# Patient Record
Sex: MALE | Race: WHITE | NOT HISPANIC OR LATINO | Employment: OTHER | URBAN - METROPOLITAN AREA
[De-identification: names, ages, dates, MRNs, and addresses within clinical notes are randomized per-mention and may not be internally consistent; named-entity substitution may affect disease eponyms.]

---

## 2019-05-15 ENCOUNTER — HOSPITAL ENCOUNTER (EMERGENCY)
Facility: HOSPITAL | Age: 79
Discharge: HOME | End: 2019-05-15
Attending: EMERGENCY MEDICINE | Admitting: EMERGENCY MEDICINE

## 2019-05-15 ENCOUNTER — APPOINTMENT (OUTPATIENT)
Dept: CT IMAGING | Facility: HOSPITAL | Age: 79
End: 2019-05-15

## 2019-05-15 VITALS
WEIGHT: 180 LBS | DIASTOLIC BLOOD PRESSURE: 79 MMHG | SYSTOLIC BLOOD PRESSURE: 195 MMHG | RESPIRATION RATE: 14 BRPM | HEART RATE: 77 BPM | OXYGEN SATURATION: 95 % | BODY MASS INDEX: 28.93 KG/M2 | HEIGHT: 66 IN | TEMPERATURE: 97.6 F

## 2019-05-15 DIAGNOSIS — C64.1 RENAL CELL CARCINOMA OF RIGHT KIDNEY (HCC): ICD-10-CM

## 2019-05-15 DIAGNOSIS — R31.0 GROSS HEMATURIA: Primary | ICD-10-CM

## 2019-05-15 LAB
ALBUMIN SERPL-MCNC: 4.6 G/DL (ref 3.5–5.2)
ALBUMIN/GLOB SERPL: 1.8 G/DL
ALP SERPL-CCNC: 79 U/L (ref 39–117)
ALT SERPL W P-5'-P-CCNC: 10 U/L (ref 1–41)
ANION GAP SERPL CALCULATED.3IONS-SCNC: 13 MMOL/L
AST SERPL-CCNC: 17 U/L (ref 1–40)
BACTERIA UR QL AUTO: ABNORMAL /HPF
BASOPHILS # BLD AUTO: 0.02 10*3/MM3 (ref 0–0.2)
BASOPHILS NFR BLD AUTO: 0.3 % (ref 0–1.5)
BILIRUB SERPL-MCNC: 1.1 MG/DL (ref 0.2–1.2)
BILIRUB UR QL STRIP: NEGATIVE
BUN BLD-MCNC: 14 MG/DL (ref 8–23)
BUN/CREAT SERPL: 12.3 (ref 7–25)
CALCIUM SPEC-SCNC: 9.7 MG/DL (ref 8.6–10.5)
CHLORIDE SERPL-SCNC: 102 MMOL/L (ref 98–107)
CLARITY UR: ABNORMAL
CO2 SERPL-SCNC: 26 MMOL/L (ref 22–29)
COLOR UR: ABNORMAL
CREAT BLD-MCNC: 1.14 MG/DL (ref 0.76–1.27)
DEPRECATED RDW RBC AUTO: 43.6 FL (ref 37–54)
EOSINOPHIL # BLD AUTO: 0.11 10*3/MM3 (ref 0–0.4)
EOSINOPHIL NFR BLD AUTO: 1.5 % (ref 0.3–6.2)
ERYTHROCYTE [DISTWIDTH] IN BLOOD BY AUTOMATED COUNT: 13.3 % (ref 12.3–15.4)
GFR SERPL CREATININE-BSD FRML MDRD: 62 ML/MIN/1.73
GLOBULIN UR ELPH-MCNC: 2.6 GM/DL
GLUCOSE BLD-MCNC: 98 MG/DL (ref 65–99)
GLUCOSE UR STRIP-MCNC: NEGATIVE MG/DL
HCT VFR BLD AUTO: 47.5 % (ref 37.5–51)
HGB BLD-MCNC: 17.3 G/DL (ref 13–17.7)
HGB UR QL STRIP.AUTO: ABNORMAL
HOLD SPECIMEN: NORMAL
HOLD SPECIMEN: NORMAL
HYALINE CASTS UR QL AUTO: ABNORMAL /LPF
IMM GRANULOCYTES # BLD AUTO: 0.02 10*3/MM3 (ref 0–0.05)
IMM GRANULOCYTES NFR BLD AUTO: 0.3 % (ref 0–0.5)
KETONES UR QL STRIP: ABNORMAL
LEUKOCYTE ESTERASE UR QL STRIP.AUTO: ABNORMAL
LIPASE SERPL-CCNC: 17 U/L (ref 13–60)
LYMPHOCYTES # BLD AUTO: 1.51 10*3/MM3 (ref 0.7–3.1)
LYMPHOCYTES NFR BLD AUTO: 20 % (ref 19.6–45.3)
MCH RBC QN AUTO: 33.1 PG (ref 26.6–33)
MCHC RBC AUTO-ENTMCNC: 36.4 G/DL (ref 31.5–35.7)
MCV RBC AUTO: 91 FL (ref 79–97)
MONOCYTES # BLD AUTO: 0.51 10*3/MM3 (ref 0.1–0.9)
MONOCYTES NFR BLD AUTO: 6.8 % (ref 5–12)
NEUTROPHILS # BLD AUTO: 5.38 10*3/MM3 (ref 1.7–7)
NEUTROPHILS NFR BLD AUTO: 71.1 % (ref 42.7–76)
NITRITE UR QL STRIP: NEGATIVE
PH UR STRIP.AUTO: <=5 [PH] (ref 5–8)
PLATELET # BLD AUTO: 156 10*3/MM3 (ref 140–450)
PMV BLD AUTO: 8.9 FL (ref 6–12)
POTASSIUM BLD-SCNC: 5 MMOL/L (ref 3.5–5.2)
PROT SERPL-MCNC: 7.2 G/DL (ref 6–8.5)
PROT UR QL STRIP: ABNORMAL
RBC # BLD AUTO: 5.22 10*6/MM3 (ref 4.14–5.8)
RBC # UR: ABNORMAL /HPF
REF LAB TEST METHOD: ABNORMAL
SODIUM BLD-SCNC: 141 MMOL/L (ref 136–145)
SP GR UR STRIP: 1.02 (ref 1–1.03)
SQUAMOUS #/AREA URNS HPF: ABNORMAL /HPF
UROBILINOGEN UR QL STRIP: ABNORMAL
WBC NRBC COR # BLD: 7.55 10*3/MM3 (ref 3.4–10.8)
WBC UR QL AUTO: ABNORMAL /HPF
WHOLE BLOOD HOLD SPECIMEN: NORMAL
WHOLE BLOOD HOLD SPECIMEN: NORMAL
YEAST URNS QL MICRO: ABNORMAL /HPF

## 2019-05-15 PROCEDURE — 83690 ASSAY OF LIPASE: CPT

## 2019-05-15 PROCEDURE — 80053 COMPREHEN METABOLIC PANEL: CPT

## 2019-05-15 PROCEDURE — 99283 EMERGENCY DEPT VISIT LOW MDM: CPT

## 2019-05-15 PROCEDURE — 85025 COMPLETE CBC W/AUTO DIFF WBC: CPT

## 2019-05-15 PROCEDURE — 74176 CT ABD & PELVIS W/O CONTRAST: CPT

## 2019-05-15 PROCEDURE — 81001 URINALYSIS AUTO W/SCOPE: CPT | Performed by: EMERGENCY MEDICINE

## 2019-05-15 RX ORDER — SODIUM CHLORIDE 0.9 % (FLUSH) 0.9 %
10 SYRINGE (ML) INJECTION AS NEEDED
Status: DISCONTINUED | OUTPATIENT
Start: 2019-05-15 | End: 2019-05-15 | Stop reason: HOSPADM

## 2019-05-15 NOTE — ED PROVIDER NOTES
Subjective   [place] is a 79 y.o. male who presents to the ED with complaints of hematuria for the past couple of days. The patient reports that he is also experiencing mild penile tenderness. He denies dizziness, nausea, vomiting, chest pain, and back pain. The patient reports that he experienced similar symptoms a few months ago, but his symptoms resolved on their own. He denies taking anticoagulants. He also denies a history of prostate problems. There are no other acute complaints at this time.         History provided by:  Patient  Blood in Urine   This is a recurrent problem. The current episode started in the past 7 days. The problem is unchanged. His pain is at a severity of 0/10. The pain is mild. Irritative symptoms do not include frequency or urgency. Associated symptoms include genital pain. Pertinent negatives include no nausea or vomiting.       Review of Systems   Cardiovascular: Negative for chest pain.   Gastrointestinal: Negative for nausea and vomiting.   Genitourinary: Positive for hematuria and penile pain. Negative for frequency and urgency.   Musculoskeletal: Negative for back pain.   Neurological: Negative for dizziness.   All other systems reviewed and are negative.      No past medical history on file.  Patient denies PMH.  No Known Allergies    No past surgical history on file.    No family history on file.    Social History     Socioeconomic History   • Marital status:      Spouse name: Not on file   • Number of children: Not on file   • Years of education: Not on file   • Highest education level: Not on file         Objective   Physical Exam   Constitutional: He is oriented to person, place, and time. He appears well-developed and well-nourished. No distress.   HENT:   Head: Normocephalic and atraumatic.   Eyes: Conjunctivae are normal. No scleral icterus.   Neck: Normal range of motion. Neck supple.   Cardiovascular: Normal rate, regular rhythm and normal heart sounds.    No murmur heard.  Pulmonary/Chest: Effort normal and breath sounds normal. No respiratory distress.   Abdominal: Soft. There is no tenderness. There is no rebound and no guarding.   Musculoskeletal: Normal range of motion.   Neurological: He is alert and oriented to person, place, and time.   Skin: Skin is warm and dry.   Psychiatric: He has a normal mood and affect. His behavior is normal.   Nursing note and vitals reviewed.      Procedures         ED Course     UA hematuria.  Labs benign, not significantly anemic.  CT shows large lobulated R renal mass suggestive of RCC.  Discussed with Dr Hopper who recommends outpatient eval and will see pt in the office Friday morning.  Patient stable on serial rechecks.  Discussed findings, concerns, plan of care, expected course, reasons to return and followup.                  MDM  Number of Diagnoses or Management Options  Gross hematuria:   Renal cell carcinoma of right kidney (CMS/HCC):      Amount and/or Complexity of Data Reviewed  Clinical lab tests: reviewed and ordered  Tests in the radiology section of CPT®: reviewed and ordered  Discuss the patient with other providers: yes  Independent visualization of images, tracings, or specimens: yes        Final diagnoses:   Gross hematuria   Renal cell carcinoma of right kidney (CMS/HCC)       Documentation assistance provided by naty Ramirez.  Information recorded by the scribe was done at my direction and has been verified and validated by me.     Yamileth Ramirez  05/15/19 [id number]       Yamileth Ramirez  05/15/19 [id number]       Uday Allen MD  05/15/19 2077

## 2021-12-25 ENCOUNTER — HOSPITAL ENCOUNTER (INPATIENT)
Facility: HOSPITAL | Age: 81
LOS: 7 days | Discharge: HOME HEALTH | End: 2022-01-03
Attending: EMERGENCY MEDICINE | Admitting: FAMILY MEDICINE

## 2021-12-25 DIAGNOSIS — N28.89 RIGHT RENAL MASS: Primary | ICD-10-CM

## 2021-12-25 DIAGNOSIS — R31.0 GROSS HEMATURIA: ICD-10-CM

## 2021-12-25 DIAGNOSIS — N28.9 ABNORMAL KIDNEY FUNCTION: ICD-10-CM

## 2021-12-25 DIAGNOSIS — N28.89 RIGHT RENAL MASS: ICD-10-CM

## 2021-12-25 DIAGNOSIS — N39.0 ACUTE UTI: ICD-10-CM

## 2021-12-25 DIAGNOSIS — N17.9 ACUTE RENAL FAILURE, UNSPECIFIED ACUTE RENAL FAILURE TYPE: ICD-10-CM

## 2021-12-25 DIAGNOSIS — C64.9 METASTATIC RENAL CELL CARCINOMA: ICD-10-CM

## 2021-12-25 DIAGNOSIS — R91.8 PULMONARY NODULES: ICD-10-CM

## 2021-12-25 PROCEDURE — 99285 EMERGENCY DEPT VISIT HI MDM: CPT

## 2021-12-26 ENCOUNTER — APPOINTMENT (OUTPATIENT)
Dept: CT IMAGING | Facility: HOSPITAL | Age: 81
End: 2021-12-26

## 2021-12-26 ENCOUNTER — APPOINTMENT (OUTPATIENT)
Dept: MRI IMAGING | Facility: HOSPITAL | Age: 81
End: 2021-12-26

## 2021-12-26 PROBLEM — N28.9 ABNORMAL KIDNEY FUNCTION: Status: ACTIVE | Noted: 2021-12-26

## 2021-12-26 PROBLEM — C79.9 METASTATIC CANCER: Status: ACTIVE | Noted: 2021-12-26

## 2021-12-26 PROBLEM — R31.0 GROSS HEMATURIA: Status: ACTIVE | Noted: 2021-12-26

## 2021-12-26 LAB
ALBUMIN SERPL-MCNC: 3.9 G/DL (ref 3.5–5.2)
ALBUMIN/GLOB SERPL: 1.9 G/DL
ALP SERPL-CCNC: 70 U/L (ref 39–117)
ALT SERPL W P-5'-P-CCNC: 7 U/L (ref 1–41)
ANION GAP SERPL CALCULATED.3IONS-SCNC: 10 MMOL/L (ref 5–15)
AST SERPL-CCNC: 15 U/L (ref 1–40)
BACTERIA UR QL AUTO: ABNORMAL /HPF
BASOPHILS # BLD AUTO: 0.03 10*3/MM3 (ref 0–0.2)
BASOPHILS NFR BLD AUTO: 0.4 % (ref 0–1.5)
BILIRUB SERPL-MCNC: 0.6 MG/DL (ref 0–1.2)
BILIRUB UR QL STRIP: ABNORMAL
BUN SERPL-MCNC: 34 MG/DL (ref 8–23)
BUN/CREAT SERPL: 16.5 (ref 7–25)
CALCIUM SPEC-SCNC: 9.1 MG/DL (ref 8.6–10.5)
CHLORIDE SERPL-SCNC: 103 MMOL/L (ref 98–107)
CLARITY UR: ABNORMAL
CO2 SERPL-SCNC: 24 MMOL/L (ref 22–29)
COLOR UR: ABNORMAL
CREAT SERPL-MCNC: 2.06 MG/DL (ref 0.76–1.27)
D-LACTATE SERPL-SCNC: 1 MMOL/L (ref 0.5–2)
DEPRECATED RDW RBC AUTO: 43.1 FL (ref 37–54)
EOSINOPHIL # BLD AUTO: 0.15 10*3/MM3 (ref 0–0.4)
EOSINOPHIL NFR BLD AUTO: 2.2 % (ref 0.3–6.2)
ERYTHROCYTE [DISTWIDTH] IN BLOOD BY AUTOMATED COUNT: 13.2 % (ref 12.3–15.4)
FLUAV SUBTYP SPEC NAA+PROBE: NOT DETECTED
FLUBV RNA ISLT QL NAA+PROBE: NOT DETECTED
GFR SERPL CREATININE-BSD FRML MDRD: 31 ML/MIN/1.73
GLOBULIN UR ELPH-MCNC: 2.1 GM/DL
GLUCOSE SERPL-MCNC: 107 MG/DL (ref 65–99)
GLUCOSE UR STRIP-MCNC: ABNORMAL MG/DL
HCT VFR BLD AUTO: 47 % (ref 37.5–51)
HGB BLD-MCNC: 16.3 G/DL (ref 13–17.7)
HGB UR QL STRIP.AUTO: ABNORMAL
HYALINE CASTS UR QL AUTO: ABNORMAL /LPF
IMM GRANULOCYTES # BLD AUTO: 0.04 10*3/MM3 (ref 0–0.05)
IMM GRANULOCYTES NFR BLD AUTO: 0.6 % (ref 0–0.5)
KETONES UR QL STRIP: ABNORMAL
LEUKOCYTE ESTERASE UR QL STRIP.AUTO: ABNORMAL
LYMPHOCYTES # BLD AUTO: 1.49 10*3/MM3 (ref 0.7–3.1)
LYMPHOCYTES NFR BLD AUTO: 22.3 % (ref 19.6–45.3)
MCH RBC QN AUTO: 31.2 PG (ref 26.6–33)
MCHC RBC AUTO-ENTMCNC: 34.7 G/DL (ref 31.5–35.7)
MCV RBC AUTO: 90 FL (ref 79–97)
MONOCYTES # BLD AUTO: 0.59 10*3/MM3 (ref 0.1–0.9)
MONOCYTES NFR BLD AUTO: 8.8 % (ref 5–12)
NEUTROPHILS NFR BLD AUTO: 4.38 10*3/MM3 (ref 1.7–7)
NEUTROPHILS NFR BLD AUTO: 65.7 % (ref 42.7–76)
NITRITE UR QL STRIP: POSITIVE
NRBC BLD AUTO-RTO: 0 /100 WBC (ref 0–0.2)
PH UR STRIP.AUTO: 7 [PH] (ref 5–8)
PLATELET # BLD AUTO: 145 10*3/MM3 (ref 140–450)
PMV BLD AUTO: 9 FL (ref 6–12)
POTASSIUM SERPL-SCNC: 4.6 MMOL/L (ref 3.5–5.2)
PROT SERPL-MCNC: 6 G/DL (ref 6–8.5)
PROT UR QL STRIP: ABNORMAL
RBC # BLD AUTO: 5.22 10*6/MM3 (ref 4.14–5.8)
RBC # UR STRIP: ABNORMAL /HPF
REF LAB TEST METHOD: ABNORMAL
SARS-COV-2 RNA PNL SPEC NAA+PROBE: NOT DETECTED
SODIUM SERPL-SCNC: 137 MMOL/L (ref 136–145)
SP GR UR STRIP: 1.02 (ref 1–1.03)
SQUAMOUS #/AREA URNS HPF: ABNORMAL /HPF
UROBILINOGEN UR QL STRIP: ABNORMAL
WBC # UR STRIP: ABNORMAL /HPF
WBC NRBC COR # BLD: 6.68 10*3/MM3 (ref 3.4–10.8)

## 2021-12-26 PROCEDURE — 71250 CT THORAX DX C-: CPT

## 2021-12-26 PROCEDURE — 25010000002 LORAZEPAM PER 2 MG: Performed by: HOSPITALIST

## 2021-12-26 PROCEDURE — G0378 HOSPITAL OBSERVATION PER HR: HCPCS

## 2021-12-26 PROCEDURE — 81001 URINALYSIS AUTO W/SCOPE: CPT | Performed by: EMERGENCY MEDICINE

## 2021-12-26 PROCEDURE — 83605 ASSAY OF LACTIC ACID: CPT | Performed by: PHYSICIAN ASSISTANT

## 2021-12-26 PROCEDURE — 93005 ELECTROCARDIOGRAM TRACING: CPT | Performed by: HOSPITALIST

## 2021-12-26 PROCEDURE — 87076 CULTURE ANAEROBE IDENT EACH: CPT | Performed by: PHYSICIAN ASSISTANT

## 2021-12-26 PROCEDURE — 99204 OFFICE O/P NEW MOD 45 MIN: CPT | Performed by: STUDENT IN AN ORGANIZED HEALTH CARE EDUCATION/TRAINING PROGRAM

## 2021-12-26 PROCEDURE — 70551 MRI BRAIN STEM W/O DYE: CPT

## 2021-12-26 PROCEDURE — 87086 URINE CULTURE/COLONY COUNT: CPT | Performed by: EMERGENCY MEDICINE

## 2021-12-26 PROCEDURE — 25010000002 CEFTRIAXONE PER 250 MG: Performed by: PHYSICIAN ASSISTANT

## 2021-12-26 PROCEDURE — 87185 SC STD ENZYME DETCJ PER NZM: CPT | Performed by: PHYSICIAN ASSISTANT

## 2021-12-26 PROCEDURE — 87077 CULTURE AEROBIC IDENTIFY: CPT | Performed by: EMERGENCY MEDICINE

## 2021-12-26 PROCEDURE — 51702 INSERT TEMP BLADDER CATH: CPT | Performed by: STUDENT IN AN ORGANIZED HEALTH CARE EDUCATION/TRAINING PROGRAM

## 2021-12-26 PROCEDURE — 80053 COMPREHEN METABOLIC PANEL: CPT | Performed by: PHYSICIAN ASSISTANT

## 2021-12-26 PROCEDURE — 99222 1ST HOSP IP/OBS MODERATE 55: CPT | Performed by: INTERNAL MEDICINE

## 2021-12-26 PROCEDURE — 87040 BLOOD CULTURE FOR BACTERIA: CPT | Performed by: PHYSICIAN ASSISTANT

## 2021-12-26 PROCEDURE — 93010 ELECTROCARDIOGRAM REPORT: CPT | Performed by: INTERNAL MEDICINE

## 2021-12-26 PROCEDURE — 87636 SARSCOV2 & INF A&B AMP PRB: CPT | Performed by: PHYSICIAN ASSISTANT

## 2021-12-26 PROCEDURE — 87186 SC STD MICRODIL/AGAR DIL: CPT | Performed by: EMERGENCY MEDICINE

## 2021-12-26 PROCEDURE — 85025 COMPLETE CBC W/AUTO DIFF WBC: CPT | Performed by: PHYSICIAN ASSISTANT

## 2021-12-26 PROCEDURE — 74176 CT ABD & PELVIS W/O CONTRAST: CPT

## 2021-12-26 RX ORDER — ACETAMINOPHEN 650 MG/1
650 SUPPOSITORY RECTAL EVERY 4 HOURS PRN
Status: DISCONTINUED | OUTPATIENT
Start: 2021-12-26 | End: 2022-01-03 | Stop reason: HOSPADM

## 2021-12-26 RX ORDER — ACETAMINOPHEN 160 MG/5ML
650 SOLUTION ORAL EVERY 4 HOURS PRN
Status: DISCONTINUED | OUTPATIENT
Start: 2021-12-26 | End: 2022-01-03 | Stop reason: HOSPADM

## 2021-12-26 RX ORDER — LORAZEPAM 2 MG/ML
0.5 INJECTION INTRAMUSCULAR ONCE
Status: COMPLETED | OUTPATIENT
Start: 2021-12-27 | End: 2021-12-26

## 2021-12-26 RX ORDER — MAGNESIUM SULFATE 1 G/100ML
1 INJECTION INTRAVENOUS AS NEEDED
Status: DISCONTINUED | OUTPATIENT
Start: 2021-12-26 | End: 2022-01-03 | Stop reason: HOSPADM

## 2021-12-26 RX ORDER — AMLODIPINE BESYLATE 5 MG/1
5 TABLET ORAL ONCE
Status: COMPLETED | OUTPATIENT
Start: 2021-12-26 | End: 2021-12-26

## 2021-12-26 RX ORDER — LORAZEPAM 2 MG/ML
0.5 INJECTION INTRAMUSCULAR ONCE
Status: COMPLETED | OUTPATIENT
Start: 2021-12-26 | End: 2021-12-26

## 2021-12-26 RX ORDER — DOCUSATE SODIUM 100 MG/1
100 CAPSULE, LIQUID FILLED ORAL 2 TIMES DAILY
Status: DISCONTINUED | OUTPATIENT
Start: 2021-12-26 | End: 2022-01-03 | Stop reason: HOSPADM

## 2021-12-26 RX ORDER — ACETAMINOPHEN 325 MG/1
650 TABLET ORAL EVERY 4 HOURS PRN
Status: DISCONTINUED | OUTPATIENT
Start: 2021-12-26 | End: 2022-01-03 | Stop reason: HOSPADM

## 2021-12-26 RX ORDER — MAGNESIUM SULFATE HEPTAHYDRATE 40 MG/ML
2 INJECTION, SOLUTION INTRAVENOUS AS NEEDED
Status: DISCONTINUED | OUTPATIENT
Start: 2021-12-26 | End: 2022-01-03 | Stop reason: HOSPADM

## 2021-12-26 RX ORDER — MAGNESIUM SULFATE HEPTAHYDRATE 40 MG/ML
4 INJECTION, SOLUTION INTRAVENOUS AS NEEDED
Status: DISCONTINUED | OUTPATIENT
Start: 2021-12-26 | End: 2022-01-03 | Stop reason: HOSPADM

## 2021-12-26 RX ORDER — SODIUM CHLORIDE 0.9 % (FLUSH) 0.9 %
10 SYRINGE (ML) INJECTION AS NEEDED
Status: DISCONTINUED | OUTPATIENT
Start: 2021-12-26 | End: 2021-12-29

## 2021-12-26 RX ORDER — SODIUM CHLORIDE 0.9 % (FLUSH) 0.9 %
10 SYRINGE (ML) INJECTION EVERY 12 HOURS SCHEDULED
Status: DISCONTINUED | OUTPATIENT
Start: 2021-12-26 | End: 2021-12-29

## 2021-12-26 RX ORDER — SODIUM CHLORIDE 9 MG/ML
100 INJECTION, SOLUTION INTRAVENOUS CONTINUOUS
Status: DISCONTINUED | OUTPATIENT
Start: 2021-12-26 | End: 2021-12-26

## 2021-12-26 RX ADMIN — LORAZEPAM 0.5 MG: 2 INJECTION INTRAMUSCULAR; INTRAVENOUS at 23:57

## 2021-12-26 RX ADMIN — SODIUM CHLORIDE 1000 ML: 9 INJECTION, SOLUTION INTRAVENOUS at 01:08

## 2021-12-26 RX ADMIN — LORAZEPAM 0.5 MG: 2 INJECTION INTRAMUSCULAR; INTRAVENOUS at 17:37

## 2021-12-26 RX ADMIN — SODIUM CHLORIDE 1 G: 900 INJECTION INTRAVENOUS at 03:09

## 2021-12-26 RX ADMIN — SODIUM CHLORIDE 100 ML/HR: 9 INJECTION, SOLUTION INTRAVENOUS at 05:42

## 2021-12-26 RX ADMIN — SODIUM CHLORIDE, PRESERVATIVE FREE 10 ML: 5 INJECTION INTRAVENOUS at 20:19

## 2021-12-26 RX ADMIN — ACETAMINOPHEN 650 MG: 325 TABLET, FILM COATED ORAL at 17:37

## 2021-12-26 RX ADMIN — AMLODIPINE BESYLATE 5 MG: 5 TABLET ORAL at 17:37

## 2021-12-27 ENCOUNTER — APPOINTMENT (OUTPATIENT)
Dept: ULTRASOUND IMAGING | Facility: HOSPITAL | Age: 81
End: 2021-12-27

## 2021-12-27 PROBLEM — N28.89 RIGHT RENAL MASS: Status: ACTIVE | Noted: 2021-12-27

## 2021-12-27 LAB
CREAT UR-MCNC: 90.6 MG/DL
EOSINOPHIL SPEC QL MICRO: 0 % EOS/100 CELLS (ref 0–0)
MAGNESIUM SERPL-MCNC: 2.1 MG/DL (ref 1.6–2.4)
SODIUM UR-SCNC: 171 MMOL/L
TSH SERPL DL<=0.05 MIU/L-ACNC: 3.3 UIU/ML (ref 0.27–4.2)

## 2021-12-27 PROCEDURE — 99222 1ST HOSP IP/OBS MODERATE 55: CPT | Performed by: INTERNAL MEDICINE

## 2021-12-27 PROCEDURE — 99232 SBSQ HOSP IP/OBS MODERATE 35: CPT | Performed by: HOSPITALIST

## 2021-12-27 PROCEDURE — 84443 ASSAY THYROID STIM HORMONE: CPT | Performed by: NURSE PRACTITIONER

## 2021-12-27 PROCEDURE — 99232 SBSQ HOSP IP/OBS MODERATE 35: CPT | Performed by: STUDENT IN AN ORGANIZED HEALTH CARE EDUCATION/TRAINING PROGRAM

## 2021-12-27 PROCEDURE — 82570 ASSAY OF URINE CREATININE: CPT | Performed by: INTERNAL MEDICINE

## 2021-12-27 PROCEDURE — 25010000002 LORAZEPAM PER 2 MG: Performed by: FAMILY MEDICINE

## 2021-12-27 PROCEDURE — 83735 ASSAY OF MAGNESIUM: CPT | Performed by: NURSE PRACTITIONER

## 2021-12-27 PROCEDURE — 87205 SMEAR GRAM STAIN: CPT | Performed by: INTERNAL MEDICINE

## 2021-12-27 PROCEDURE — 84300 ASSAY OF URINE SODIUM: CPT | Performed by: INTERNAL MEDICINE

## 2021-12-27 PROCEDURE — 25010000002 CEFTRIAXONE PER 250 MG: Performed by: HOSPITALIST

## 2021-12-27 PROCEDURE — 76775 US EXAM ABDO BACK WALL LIM: CPT

## 2021-12-27 PROCEDURE — 25010000002 MORPHINE PER 10 MG: Performed by: INTERNAL MEDICINE

## 2021-12-27 RX ORDER — MORPHINE SULFATE 2 MG/ML
2 INJECTION, SOLUTION INTRAMUSCULAR; INTRAVENOUS ONCE
Status: COMPLETED | OUTPATIENT
Start: 2021-12-27 | End: 2021-12-27

## 2021-12-27 RX ORDER — HYDROMORPHONE HYDROCHLORIDE 1 MG/ML
0.25 INJECTION, SOLUTION INTRAMUSCULAR; INTRAVENOUS; SUBCUTANEOUS
Status: DISCONTINUED | OUTPATIENT
Start: 2021-12-27 | End: 2022-01-02

## 2021-12-27 RX ORDER — MORPHINE SULFATE 2 MG/ML
0.5 INJECTION, SOLUTION INTRAMUSCULAR; INTRAVENOUS ONCE
Status: DISCONTINUED | OUTPATIENT
Start: 2021-12-27 | End: 2021-12-30

## 2021-12-27 RX ORDER — MORPHINE SULFATE 2 MG/ML
0.5 INJECTION, SOLUTION INTRAMUSCULAR; INTRAVENOUS
Status: DISCONTINUED | OUTPATIENT
Start: 2021-12-27 | End: 2021-12-31

## 2021-12-27 RX ORDER — DIAPER,BRIEF,INFANT-TODD,DISP
1 EACH MISCELLANEOUS EVERY 12 HOURS PRN
Status: ACTIVE | OUTPATIENT
Start: 2021-12-27 | End: 2022-01-01

## 2021-12-27 RX ADMIN — ACETAMINOPHEN 650 MG: 325 TABLET, FILM COATED ORAL at 16:39

## 2021-12-27 RX ADMIN — SODIUM CHLORIDE, PRESERVATIVE FREE 10 ML: 5 INJECTION INTRAVENOUS at 20:03

## 2021-12-27 RX ADMIN — MORPHINE SULFATE 2 MG: 2 INJECTION, SOLUTION INTRAMUSCULAR; INTRAVENOUS at 00:18

## 2021-12-27 RX ADMIN — DOCUSATE SODIUM 100 MG: 100 CAPSULE, LIQUID FILLED ORAL at 20:03

## 2021-12-27 RX ADMIN — ACETAMINOPHEN 650 MG: 325 TABLET, FILM COATED ORAL at 12:18

## 2021-12-27 RX ADMIN — SODIUM CHLORIDE, PRESERVATIVE FREE 10 ML: 5 INJECTION INTRAVENOUS at 08:28

## 2021-12-27 RX ADMIN — ACETAMINOPHEN 650 MG: 325 TABLET, FILM COATED ORAL at 06:36

## 2021-12-27 RX ADMIN — DOCUSATE SODIUM 100 MG: 100 CAPSULE, LIQUID FILLED ORAL at 08:28

## 2021-12-27 RX ADMIN — SODIUM CHLORIDE 1 G: 900 INJECTION INTRAVENOUS at 12:17

## 2021-12-28 LAB
ALBUMIN SERPL-MCNC: 3.4 G/DL (ref 3.5–5.2)
ANION GAP SERPL CALCULATED.3IONS-SCNC: 9 MMOL/L (ref 5–15)
BACTERIA SPEC AEROBE CULT: ABNORMAL
BUN SERPL-MCNC: 23 MG/DL (ref 8–23)
BUN/CREAT SERPL: 12.4 (ref 7–25)
CALCIUM SPEC-SCNC: 8.7 MG/DL (ref 8.6–10.5)
CHLORIDE SERPL-SCNC: 108 MMOL/L (ref 98–107)
CK SERPL-CCNC: 28 U/L (ref 20–200)
CO2 SERPL-SCNC: 22 MMOL/L (ref 22–29)
CREAT SERPL-MCNC: 1.86 MG/DL (ref 0.76–1.27)
GFR SERPL CREATININE-BSD FRML MDRD: 35 ML/MIN/1.73
GLUCOSE SERPL-MCNC: 100 MG/DL (ref 65–99)
PHOSPHATE SERPL-MCNC: 3 MG/DL (ref 2.5–4.5)
POTASSIUM SERPL-SCNC: 4.3 MMOL/L (ref 3.5–5.2)
QT INTERVAL: 442 MS
QTC INTERVAL: 493 MS
SODIUM SERPL-SCNC: 139 MMOL/L (ref 136–145)

## 2021-12-28 PROCEDURE — 25010000002 CEFTRIAXONE PER 250 MG: Performed by: HOSPITALIST

## 2021-12-28 PROCEDURE — 99232 SBSQ HOSP IP/OBS MODERATE 35: CPT | Performed by: HOSPITALIST

## 2021-12-28 PROCEDURE — 80069 RENAL FUNCTION PANEL: CPT | Performed by: INTERNAL MEDICINE

## 2021-12-28 PROCEDURE — 82550 ASSAY OF CK (CPK): CPT | Performed by: INTERNAL MEDICINE

## 2021-12-28 RX ADMIN — DOCUSATE SODIUM 100 MG: 100 CAPSULE, LIQUID FILLED ORAL at 09:22

## 2021-12-28 RX ADMIN — SODIUM CHLORIDE, PRESERVATIVE FREE 10 ML: 5 INJECTION INTRAVENOUS at 20:00

## 2021-12-28 RX ADMIN — SODIUM CHLORIDE 1 G: 900 INJECTION INTRAVENOUS at 11:22

## 2021-12-28 RX ADMIN — SODIUM CHLORIDE, PRESERVATIVE FREE 10 ML: 5 INJECTION INTRAVENOUS at 09:18

## 2021-12-28 RX ADMIN — DOCUSATE SODIUM 100 MG: 100 CAPSULE, LIQUID FILLED ORAL at 20:00

## 2021-12-29 ENCOUNTER — APPOINTMENT (OUTPATIENT)
Dept: NUCLEAR MEDICINE | Facility: HOSPITAL | Age: 81
End: 2021-12-29

## 2021-12-29 LAB
ABO GROUP BLD: NORMAL
ABO GROUP BLD: NORMAL
ANION GAP SERPL CALCULATED.3IONS-SCNC: 12 MMOL/L (ref 5–15)
BLD GP AB SCN SERPL QL: NEGATIVE
BUN SERPL-MCNC: 19 MG/DL (ref 8–23)
BUN/CREAT SERPL: 9.8 (ref 7–25)
CALCIUM SPEC-SCNC: 9.2 MG/DL (ref 8.6–10.5)
CHLORIDE SERPL-SCNC: 104 MMOL/L (ref 98–107)
CO2 SERPL-SCNC: 22 MMOL/L (ref 22–29)
CREAT SERPL-MCNC: 1.93 MG/DL (ref 0.76–1.27)
GFR SERPL CREATININE-BSD FRML MDRD: 34 ML/MIN/1.73
GLUCOSE SERPL-MCNC: 125 MG/DL (ref 65–99)
POTASSIUM SERPL-SCNC: 4.2 MMOL/L (ref 3.5–5.2)
PROT ?TM UR-MCNC: 16.5 MG/DL
RH BLD: NEGATIVE
RH BLD: NEGATIVE
SODIUM SERPL-SCNC: 138 MMOL/L (ref 136–145)
T&S EXPIRATION DATE: NORMAL

## 2021-12-29 PROCEDURE — 78707 K FLOW/FUNCT IMAGE W/O DRUG: CPT

## 2021-12-29 PROCEDURE — 86923 COMPATIBILITY TEST ELECTRIC: CPT

## 2021-12-29 PROCEDURE — 86850 RBC ANTIBODY SCREEN: CPT | Performed by: UROLOGY

## 2021-12-29 PROCEDURE — 99231 SBSQ HOSP IP/OBS SF/LOW 25: CPT | Performed by: INTERNAL MEDICINE

## 2021-12-29 PROCEDURE — 0 TECHNETIUM MERTIATIDE: Performed by: INTERNAL MEDICINE

## 2021-12-29 PROCEDURE — A9562 TC99M MERTIATIDE: HCPCS | Performed by: INTERNAL MEDICINE

## 2021-12-29 PROCEDURE — 99232 SBSQ HOSP IP/OBS MODERATE 35: CPT | Performed by: INTERNAL MEDICINE

## 2021-12-29 PROCEDURE — 86901 BLOOD TYPING SEROLOGIC RH(D): CPT | Performed by: UROLOGY

## 2021-12-29 PROCEDURE — 86900 BLOOD TYPING SEROLOGIC ABO: CPT

## 2021-12-29 PROCEDURE — 86900 BLOOD TYPING SEROLOGIC ABO: CPT | Performed by: UROLOGY

## 2021-12-29 PROCEDURE — 86901 BLOOD TYPING SEROLOGIC RH(D): CPT

## 2021-12-29 PROCEDURE — 84156 ASSAY OF PROTEIN URINE: CPT | Performed by: INTERNAL MEDICINE

## 2021-12-29 PROCEDURE — 80048 BASIC METABOLIC PNL TOTAL CA: CPT | Performed by: INTERNAL MEDICINE

## 2021-12-29 PROCEDURE — 25010000002 CEFTRIAXONE PER 250 MG: Performed by: HOSPITALIST

## 2021-12-29 RX ADMIN — TECHNESCAN TC 99M MERTIATIDE 1 DOSE: 1 INJECTION, POWDER, LYOPHILIZED, FOR SOLUTION INTRAVENOUS at 11:25

## 2021-12-29 RX ADMIN — SODIUM CHLORIDE 1 G: 900 INJECTION INTRAVENOUS at 16:14

## 2021-12-29 RX ADMIN — DOCUSATE SODIUM 100 MG: 100 CAPSULE, LIQUID FILLED ORAL at 09:03

## 2021-12-30 ENCOUNTER — ANESTHESIA (OUTPATIENT)
Dept: PERIOP | Facility: HOSPITAL | Age: 81
End: 2021-12-30

## 2021-12-30 ENCOUNTER — ANESTHESIA EVENT CONVERTED (OUTPATIENT)
Dept: ANESTHESIOLOGY | Facility: HOSPITAL | Age: 81
End: 2021-12-30

## 2021-12-30 ENCOUNTER — ANESTHESIA EVENT (OUTPATIENT)
Dept: PERIOP | Facility: HOSPITAL | Age: 81
End: 2021-12-30

## 2021-12-30 PROBLEM — C64.9 METASTATIC RENAL CELL CARCINOMA (HCC): Status: ACTIVE | Noted: 2021-12-26

## 2021-12-30 PROBLEM — N18.30 CKD (CHRONIC KIDNEY DISEASE) STAGE 3, GFR 30-59 ML/MIN (HCC): Status: ACTIVE | Noted: 2021-12-26

## 2021-12-30 PROCEDURE — 50545 LAPARO RADICAL NEPHRECTOMY: CPT | Performed by: PHYSICIAN ASSISTANT

## 2021-12-30 PROCEDURE — C1889 IMPLANT/INSERT DEVICE, NOC: HCPCS | Performed by: UROLOGY

## 2021-12-30 PROCEDURE — 25010000002 CEFTRIAXONE PER 250 MG: Performed by: HOSPITALIST

## 2021-12-30 PROCEDURE — 25010000002 FENTANYL CITRATE (PF) 50 MCG/ML SOLUTION: Performed by: NURSE ANESTHETIST, CERTIFIED REGISTERED

## 2021-12-30 PROCEDURE — 0 LIDOCAINE 1 % SOLUTION: Performed by: NURSE ANESTHETIST, CERTIFIED REGISTERED

## 2021-12-30 PROCEDURE — 25010000002 FENTANYL CITRATE (PF) 50 MCG/ML SOLUTION

## 2021-12-30 PROCEDURE — 88307 TISSUE EXAM BY PATHOLOGIST: CPT | Performed by: UROLOGY

## 2021-12-30 PROCEDURE — 25010000002 ONDANSETRON PER 1 MG: Performed by: NURSE ANESTHETIST, CERTIFIED REGISTERED

## 2021-12-30 PROCEDURE — 0TT04ZZ RESECTION OF RIGHT KIDNEY, PERCUTANEOUS ENDOSCOPIC APPROACH: ICD-10-PCS | Performed by: UROLOGY

## 2021-12-30 PROCEDURE — 0 CEFAZOLIN IN DEXTROSE 2-4 GM/100ML-% SOLUTION: Performed by: UROLOGY

## 2021-12-30 PROCEDURE — 99232 SBSQ HOSP IP/OBS MODERATE 35: CPT | Performed by: INTERNAL MEDICINE

## 2021-12-30 PROCEDURE — 87040 BLOOD CULTURE FOR BACTERIA: CPT | Performed by: UROLOGY

## 2021-12-30 PROCEDURE — 25010000002 DEXAMETHASONE SODIUM PHOSPHATE 10 MG/ML SOLUTION: Performed by: NURSE ANESTHETIST, CERTIFIED REGISTERED

## 2021-12-30 PROCEDURE — 8E0W4CZ ROBOTIC ASSISTED PROCEDURE OF TRUNK REGION, PERCUTANEOUS ENDOSCOPIC APPROACH: ICD-10-PCS | Performed by: UROLOGY

## 2021-12-30 PROCEDURE — 25010000002 HYDROMORPHONE PER 4 MG: Performed by: UROLOGY

## 2021-12-30 PROCEDURE — 88313 SPECIAL STAINS GROUP 2: CPT | Performed by: UROLOGY

## 2021-12-30 PROCEDURE — 25010000002 PROPOFOL 10 MG/ML EMULSION: Performed by: NURSE ANESTHETIST, CERTIFIED REGISTERED

## 2021-12-30 PROCEDURE — 25010000002 DEXAMETHASONE PER 1 MG: Performed by: NURSE ANESTHETIST, CERTIFIED REGISTERED

## 2021-12-30 PROCEDURE — 0GT34ZZ RESECTION OF RIGHT ADRENAL GLAND, PERCUTANEOUS ENDOSCOPIC APPROACH: ICD-10-PCS | Performed by: UROLOGY

## 2021-12-30 DEVICE — ENDOPATH ECHELON ENDOSCOPIC LINEAR CUTTER RELOADS, WHITE, 60MM
Type: IMPLANTABLE DEVICE | Site: KIDNEY | Status: FUNCTIONAL
Brand: ECHELON ENDOPATH

## 2021-12-30 DEVICE — CLIP LIG HEMOLOK PA LG 6CT PRP: Type: IMPLANTABLE DEVICE | Site: KIDNEY | Status: FUNCTIONAL

## 2021-12-30 RX ORDER — SODIUM CHLORIDE 9 MG/ML
9 INJECTION, SOLUTION INTRAVENOUS ONCE
Status: COMPLETED | OUTPATIENT
Start: 2021-12-30 | End: 2021-12-30

## 2021-12-30 RX ORDER — CEFAZOLIN SODIUM 2 G/100ML
2 INJECTION, SOLUTION INTRAVENOUS ONCE
Status: COMPLETED | OUTPATIENT
Start: 2021-12-30 | End: 2021-12-30

## 2021-12-30 RX ORDER — EPHEDRINE SULFATE 50 MG/ML
5 INJECTION, SOLUTION INTRAVENOUS ONCE AS NEEDED
Status: DISCONTINUED | OUTPATIENT
Start: 2021-12-30 | End: 2021-12-30 | Stop reason: HOSPADM

## 2021-12-30 RX ORDER — FENTANYL CITRATE 50 UG/ML
50 INJECTION, SOLUTION INTRAMUSCULAR; INTRAVENOUS
Status: DISCONTINUED | OUTPATIENT
Start: 2021-12-30 | End: 2021-12-30 | Stop reason: HOSPADM

## 2021-12-30 RX ORDER — PROMETHAZINE HYDROCHLORIDE 25 MG/1
25 SUPPOSITORY RECTAL ONCE AS NEEDED
Status: DISCONTINUED | OUTPATIENT
Start: 2021-12-30 | End: 2021-12-30 | Stop reason: HOSPADM

## 2021-12-30 RX ORDER — BUPIVACAINE HCL/0.9 % NACL/PF 0.125 %
PLASTIC BAG, INJECTION (ML) EPIDURAL AS NEEDED
Status: DISCONTINUED | OUTPATIENT
Start: 2021-12-30 | End: 2021-12-30 | Stop reason: SURG

## 2021-12-30 RX ORDER — FENTANYL CITRATE 50 UG/ML
INJECTION, SOLUTION INTRAMUSCULAR; INTRAVENOUS AS NEEDED
Status: DISCONTINUED | OUTPATIENT
Start: 2021-12-30 | End: 2021-12-30 | Stop reason: SURG

## 2021-12-30 RX ORDER — EPHEDRINE SULFATE 50 MG/ML
INJECTION, SOLUTION INTRAVENOUS AS NEEDED
Status: DISCONTINUED | OUTPATIENT
Start: 2021-12-30 | End: 2021-12-30 | Stop reason: SURG

## 2021-12-30 RX ORDER — MAGNESIUM HYDROXIDE 1200 MG/15ML
LIQUID ORAL AS NEEDED
Status: DISCONTINUED | OUTPATIENT
Start: 2021-12-30 | End: 2021-12-30 | Stop reason: HOSPADM

## 2021-12-30 RX ORDER — SODIUM CHLORIDE 0.9 % (FLUSH) 0.9 %
10 SYRINGE (ML) INJECTION AS NEEDED
Status: DISCONTINUED | OUTPATIENT
Start: 2021-12-30 | End: 2021-12-30 | Stop reason: HOSPADM

## 2021-12-30 RX ORDER — SODIUM CHLORIDE 9 MG/ML
INJECTION, SOLUTION INTRAVENOUS AS NEEDED
Status: DISCONTINUED | OUTPATIENT
Start: 2021-12-30 | End: 2021-12-30 | Stop reason: HOSPADM

## 2021-12-30 RX ORDER — ONDANSETRON 2 MG/ML
INJECTION INTRAMUSCULAR; INTRAVENOUS AS NEEDED
Status: DISCONTINUED | OUTPATIENT
Start: 2021-12-30 | End: 2021-12-30 | Stop reason: SURG

## 2021-12-30 RX ORDER — DEXAMETHASONE SODIUM PHOSPHATE 10 MG/ML
INJECTION, SOLUTION INTRAMUSCULAR; INTRAVENOUS
Status: COMPLETED | OUTPATIENT
Start: 2021-12-30 | End: 2021-12-30

## 2021-12-30 RX ORDER — DEXAMETHASONE SODIUM PHOSPHATE 4 MG/ML
INJECTION, SOLUTION INTRA-ARTICULAR; INTRALESIONAL; INTRAMUSCULAR; INTRAVENOUS; SOFT TISSUE AS NEEDED
Status: DISCONTINUED | OUTPATIENT
Start: 2021-12-30 | End: 2021-12-30 | Stop reason: SURG

## 2021-12-30 RX ORDER — BUPIVACAINE HYDROCHLORIDE 2.5 MG/ML
INJECTION, SOLUTION EPIDURAL; INFILTRATION; INTRACAUDAL
Status: COMPLETED | OUTPATIENT
Start: 2021-12-30 | End: 2021-12-30

## 2021-12-30 RX ORDER — LABETALOL HYDROCHLORIDE 5 MG/ML
5 INJECTION, SOLUTION INTRAVENOUS
Status: ACTIVE | OUTPATIENT
Start: 2021-12-30 | End: 2021-12-30

## 2021-12-30 RX ORDER — LIDOCAINE HYDROCHLORIDE 10 MG/ML
0.5 INJECTION, SOLUTION EPIDURAL; INFILTRATION; INTRACAUDAL; PERINEURAL ONCE AS NEEDED
Status: DISCONTINUED | OUTPATIENT
Start: 2021-12-30 | End: 2021-12-30 | Stop reason: HOSPADM

## 2021-12-30 RX ORDER — MIDAZOLAM HYDROCHLORIDE 1 MG/ML
0.5 INJECTION INTRAMUSCULAR; INTRAVENOUS
Status: DISCONTINUED | OUTPATIENT
Start: 2021-12-30 | End: 2021-12-30 | Stop reason: HOSPADM

## 2021-12-30 RX ORDER — FENTANYL CITRATE 50 UG/ML
INJECTION, SOLUTION INTRAMUSCULAR; INTRAVENOUS
Status: COMPLETED
Start: 2021-12-30 | End: 2021-12-30

## 2021-12-30 RX ORDER — LABETALOL HYDROCHLORIDE 5 MG/ML
INJECTION, SOLUTION INTRAVENOUS
Status: COMPLETED
Start: 2021-12-30 | End: 2021-12-30

## 2021-12-30 RX ORDER — SODIUM CHLORIDE, SODIUM LACTATE, POTASSIUM CHLORIDE, CALCIUM CHLORIDE 600; 310; 30; 20 MG/100ML; MG/100ML; MG/100ML; MG/100ML
9 INJECTION, SOLUTION INTRAVENOUS CONTINUOUS
Status: DISCONTINUED | OUTPATIENT
Start: 2021-12-30 | End: 2022-01-03 | Stop reason: HOSPADM

## 2021-12-30 RX ORDER — FAMOTIDINE 20 MG/1
20 TABLET, FILM COATED ORAL ONCE
Status: COMPLETED | OUTPATIENT
Start: 2021-12-30 | End: 2021-12-30

## 2021-12-30 RX ORDER — ROCURONIUM BROMIDE 10 MG/ML
INJECTION, SOLUTION INTRAVENOUS AS NEEDED
Status: DISCONTINUED | OUTPATIENT
Start: 2021-12-30 | End: 2021-12-30 | Stop reason: SURG

## 2021-12-30 RX ORDER — LIDOCAINE HYDROCHLORIDE 10 MG/ML
INJECTION, SOLUTION INFILTRATION; PERINEURAL AS NEEDED
Status: DISCONTINUED | OUTPATIENT
Start: 2021-12-30 | End: 2021-12-30 | Stop reason: SURG

## 2021-12-30 RX ORDER — SODIUM CHLORIDE 0.9 % (FLUSH) 0.9 %
10 SYRINGE (ML) INJECTION EVERY 12 HOURS SCHEDULED
Status: DISCONTINUED | OUTPATIENT
Start: 2021-12-30 | End: 2021-12-30 | Stop reason: HOSPADM

## 2021-12-30 RX ORDER — PROPOFOL 10 MG/ML
VIAL (ML) INTRAVENOUS AS NEEDED
Status: DISCONTINUED | OUTPATIENT
Start: 2021-12-30 | End: 2021-12-30 | Stop reason: SURG

## 2021-12-30 RX ORDER — FAMOTIDINE 10 MG/ML
20 INJECTION, SOLUTION INTRAVENOUS ONCE
Status: DISCONTINUED | OUTPATIENT
Start: 2021-12-30 | End: 2021-12-30 | Stop reason: HOSPADM

## 2021-12-30 RX ORDER — PROMETHAZINE HYDROCHLORIDE 25 MG/1
25 TABLET ORAL ONCE AS NEEDED
Status: DISCONTINUED | OUTPATIENT
Start: 2021-12-30 | End: 2021-12-30 | Stop reason: HOSPADM

## 2021-12-30 RX ADMIN — LABETALOL HYDROCHLORIDE 5 MG: 5 INJECTION, SOLUTION INTRAVENOUS at 09:57

## 2021-12-30 RX ADMIN — FENTANYL CITRATE 50 MCG: 50 INJECTION INTRAMUSCULAR; INTRAVENOUS at 09:40

## 2021-12-30 RX ADMIN — LABETALOL 20 MG/4 ML (5 MG/ML) INTRAVENOUS SYRINGE 5 MG: at 09:57

## 2021-12-30 RX ADMIN — SODIUM CHLORIDE: 9 INJECTION, SOLUTION INTRAVENOUS at 07:21

## 2021-12-30 RX ADMIN — DOCUSATE SODIUM 100 MG: 100 CAPSULE, LIQUID FILLED ORAL at 20:19

## 2021-12-30 RX ADMIN — FAMOTIDINE 20 MG: 20 TABLET ORAL at 06:37

## 2021-12-30 RX ADMIN — ROCURONIUM BROMIDE 50 MG: 10 INJECTION, SOLUTION INTRAVENOUS at 07:26

## 2021-12-30 RX ADMIN — PROPOFOL 150 MG: 10 INJECTION, EMULSION INTRAVENOUS at 07:26

## 2021-12-30 RX ADMIN — CEFAZOLIN SODIUM 2 G: 2 INJECTION, SOLUTION INTRAVENOUS at 07:31

## 2021-12-30 RX ADMIN — METOPROLOL TARTRATE 2 MG: 5 INJECTION INTRAVENOUS at 07:26

## 2021-12-30 RX ADMIN — FENTANYL CITRATE 25 MCG: 50 INJECTION, SOLUTION INTRAMUSCULAR; INTRAVENOUS at 09:06

## 2021-12-30 RX ADMIN — SODIUM CHLORIDE, PRESERVATIVE FREE 10 ML: 5 INJECTION INTRAVENOUS at 06:38

## 2021-12-30 RX ADMIN — FENTANYL CITRATE 50 MCG: 50 INJECTION INTRAMUSCULAR; INTRAVENOUS at 10:00

## 2021-12-30 RX ADMIN — LIDOCAINE HYDROCHLORIDE 50 MG: 10 INJECTION, SOLUTION INFILTRATION; PERINEURAL at 07:26

## 2021-12-30 RX ADMIN — HYDROMORPHONE HYDROCHLORIDE 0.25 MG: 1 INJECTION, SOLUTION INTRAMUSCULAR; INTRAVENOUS; SUBCUTANEOUS at 11:00

## 2021-12-30 RX ADMIN — SODIUM CHLORIDE 1 G: 900 INJECTION INTRAVENOUS at 11:28

## 2021-12-30 RX ADMIN — FENTANYL CITRATE 25 MCG: 50 INJECTION, SOLUTION INTRAMUSCULAR; INTRAVENOUS at 09:12

## 2021-12-30 RX ADMIN — ONDANSETRON 4 MG: 2 INJECTION INTRAMUSCULAR; INTRAVENOUS at 09:19

## 2021-12-30 RX ADMIN — SUGAMMADEX 200 MG: 100 INJECTION, SOLUTION INTRAVENOUS at 09:19

## 2021-12-30 RX ADMIN — Medication 100 MCG: at 07:26

## 2021-12-30 RX ADMIN — FENTANYL CITRATE 50 MCG: 50 INJECTION INTRAMUSCULAR; INTRAVENOUS at 09:50

## 2021-12-30 RX ADMIN — DEXAMETHASONE SODIUM PHOSPHATE 4 MG: 10 INJECTION, SOLUTION INTRAMUSCULAR; INTRAVENOUS at 07:30

## 2021-12-30 RX ADMIN — SODIUM CHLORIDE 9 ML/HR: 9 INJECTION, SOLUTION INTRAVENOUS at 06:25

## 2021-12-30 RX ADMIN — SODIUM CHLORIDE: 9 INJECTION, SOLUTION INTRAVENOUS at 08:27

## 2021-12-30 RX ADMIN — DEXAMETHASONE SODIUM PHOSPHATE 8 MG: 4 INJECTION, SOLUTION INTRA-ARTICULAR; INTRALESIONAL; INTRAMUSCULAR; INTRAVENOUS; SOFT TISSUE at 07:37

## 2021-12-30 RX ADMIN — ACETAMINOPHEN 650 MG: 325 TABLET, FILM COATED ORAL at 20:19

## 2021-12-30 RX ADMIN — FENTANYL CITRATE 50 MCG: 50 INJECTION, SOLUTION INTRAMUSCULAR; INTRAVENOUS at 07:55

## 2021-12-30 RX ADMIN — BUPIVACAINE HYDROCHLORIDE 60 ML: 2.5 INJECTION, SOLUTION EPIDURAL; INFILTRATION; INTRACAUDAL; PERINEURAL at 07:30

## 2021-12-30 RX ADMIN — EPHEDRINE SULFATE 10 MG: 50 INJECTION INTRAVENOUS at 07:45

## 2021-12-30 RX ADMIN — ROCURONIUM BROMIDE 20 MG: 10 INJECTION, SOLUTION INTRAVENOUS at 09:01

## 2021-12-30 NOTE — ANESTHESIA PROCEDURE NOTES
Airway  Urgency: elective    Date/Time: 12/30/2021 7:30 AM  Airway not difficult    General Information and Staff    Patient location during procedure: OR  CRNA: Chino Maldonado CRNA    Indications and Patient Condition  Indications for airway management: airway protection    Preoxygenated: yes  MILS not maintained throughout  Mask difficulty assessment: 1 - vent by mask    Final Airway Details  Final airway type: endotracheal airway      Successful airway: ETT  Cuffed: yes   Successful intubation technique: direct laryngoscopy  Endotracheal tube insertion site: oral  Blade: Patricia  Blade size: 3  ETT size (mm): 7.0  Cormack-Lehane Classification: grade I - full view of glottis  Placement verified by: chest auscultation and capnometry   Cuff volume (mL): 8  Measured from: lips  ETT/EBT  to lips (cm): 20  Number of attempts at approach: 1  Assessment: lips, teeth, and gum same as pre-op and atraumatic intubation    Additional Comments  Negative epigastric sounds, Breath sound equal bilaterally with symmetric chest rise and fall

## 2021-12-30 NOTE — ANESTHESIA PROCEDURE NOTES
Peripheral Block      Patient reassessed immediately prior to procedure    Patient location during procedure: OR  Reason for block: at surgeon's request and post-op pain management  Performed by  CRNA: Ander Bennett CRNA  Preanesthetic Checklist  Completed: patient identified, IV checked, site marked, risks and benefits discussed, surgical consent, monitors and equipment checked, pre-op evaluation and timeout performed  Prep:  Pt Position: supine  Sterile barriers:cap, gloves, sterile barriers and mask  Prep: ChloraPrep  Patient monitoring: blood pressure monitoring, continuous pulse oximetry and EKG  Procedure    Sedation: yes  Performed under: general  Guidance:ultrasound guided  Images:still images obtained, printed/placed on chart    Laterality:Bilateral  Block Type:TAP  Injection Technique:single-shot  Needle Type:short-bevel and echogenic  Needle Gauge:20 G  Resistance on Injection: none    Medications Used: dexamethasone sodium phosphate injection, 4 mg  bupivacaine PF (MARCAINE) 0.25 % injection, 60 mL  Med administered at 12/30/2021 7:30 AM      Medications  Preservative Free Saline:10ml  Comment:Block Injection:  LA dose divided between Right and Left block        Post Assessment  Injection Assessment: negative aspiration for heme, incremental injection and no paresthesia on injection  Patient Tolerance:comfortable throughout block  Complications:no  Additional Notes      Under Ultrasound guidance, a BBraun 4inch 360 degree needle was advanced with Normal Saline hydro dissection of tissue.  The Internal Oblique and Transversus Abdominus muscles where visualized.  At or before the aponeurosis of Internal Oblique, local anesthetic spread was visualized in the Transversus Abdominus Plane. Injection was made incrementally with aspiration every 5 mls.  There was no  intravascular injection,  injection pressure was normal, there was no neural injection, and the procedure was completed without difficulty.   Thank You.

## 2021-12-30 NOTE — ANESTHESIA PREPROCEDURE EVALUATION
Anesthesia Evaluation     Patient summary reviewed and Nursing notes reviewed                Airway   Mallampati: I  TM distance: >3 FB  Neck ROM: full  No difficulty expected  Dental - normal exam     Pulmonary - negative pulmonary ROS and normal exam   Cardiovascular - negative cardio ROS and normal exam      ROS comment: DEXTROCARDIA    Neuro/Psych- negative ROS  GI/Hepatic/Renal/Endo - negative ROS     ROS Comment: GROSS HEMATURIA    Musculoskeletal (-) negative ROS    Abdominal  - normal exam    Bowel sounds: normal.   Substance History - negative use     OB/GYN negative ob/gyn ROS         Other      history of cancer (RIGHT RENAL CANCER, METASTATIC TO LUNG)                    Anesthesia Plan    ASA 3     general   (TAPS FOR POSTOP PAIN)  intravenous induction     Anesthetic plan, all risks, benefits, and alternatives have been provided, discussed and informed consent has been obtained with: patient.    Plan discussed with CRNA.

## 2021-12-31 LAB
ALBUMIN SERPL-MCNC: 3.7 G/DL (ref 3.5–5.2)
ANION GAP SERPL CALCULATED.3IONS-SCNC: 11 MMOL/L (ref 5–15)
BUN SERPL-MCNC: 27 MG/DL (ref 8–23)
BUN/CREAT SERPL: 15.3 (ref 7–25)
CALCIUM SPEC-SCNC: 9.3 MG/DL (ref 8.6–10.5)
CHLORIDE SERPL-SCNC: 103 MMOL/L (ref 98–107)
CO2 SERPL-SCNC: 23 MMOL/L (ref 22–29)
CREAT SERPL-MCNC: 1.77 MG/DL (ref 0.76–1.27)
GFR SERPL CREATININE-BSD FRML MDRD: 37 ML/MIN/1.73
GLUCOSE SERPL-MCNC: 131 MG/DL (ref 65–99)
PHOSPHATE SERPL-MCNC: 4 MG/DL (ref 2.5–4.5)
POTASSIUM SERPL-SCNC: 4.4 MMOL/L (ref 3.5–5.2)
POTASSIUM SERPL-SCNC: 6.3 MMOL/L (ref 3.5–5.2)
SODIUM SERPL-SCNC: 137 MMOL/L (ref 136–145)

## 2021-12-31 PROCEDURE — 84132 ASSAY OF SERUM POTASSIUM: CPT | Performed by: INTERNAL MEDICINE

## 2021-12-31 PROCEDURE — 99232 SBSQ HOSP IP/OBS MODERATE 35: CPT | Performed by: INTERNAL MEDICINE

## 2021-12-31 PROCEDURE — 97116 GAIT TRAINING THERAPY: CPT

## 2021-12-31 PROCEDURE — 97530 THERAPEUTIC ACTIVITIES: CPT

## 2021-12-31 PROCEDURE — 80069 RENAL FUNCTION PANEL: CPT | Performed by: INTERNAL MEDICINE

## 2021-12-31 PROCEDURE — 97161 PT EVAL LOW COMPLEX 20 MIN: CPT

## 2021-12-31 PROCEDURE — 25010000002 HYDROMORPHONE PER 4 MG: Performed by: UROLOGY

## 2021-12-31 RX ORDER — SODIUM CHLORIDE 9 MG/ML
75 INJECTION, SOLUTION INTRAVENOUS CONTINUOUS
Status: DISCONTINUED | OUTPATIENT
Start: 2021-12-31 | End: 2021-12-31

## 2021-12-31 RX ORDER — HYDROCODONE BITARTRATE AND ACETAMINOPHEN 5; 325 MG/1; MG/1
1 TABLET ORAL EVERY 6 HOURS PRN
Status: DISCONTINUED | OUTPATIENT
Start: 2021-12-31 | End: 2022-01-03 | Stop reason: HOSPADM

## 2021-12-31 RX ADMIN — HYDROMORPHONE HYDROCHLORIDE 0.25 MG: 1 INJECTION, SOLUTION INTRAMUSCULAR; INTRAVENOUS; SUBCUTANEOUS at 03:52

## 2021-12-31 RX ADMIN — SODIUM ZIRCONIUM CYCLOSILICATE 10 G: 10 POWDER, FOR SUSPENSION ORAL at 08:11

## 2021-12-31 RX ADMIN — ACETAMINOPHEN 650 MG: 325 TABLET, FILM COATED ORAL at 08:55

## 2021-12-31 RX ADMIN — DOCUSATE SODIUM 100 MG: 100 CAPSULE, LIQUID FILLED ORAL at 08:54

## 2021-12-31 RX ADMIN — SODIUM CHLORIDE 75 ML/HR: 9 INJECTION, SOLUTION INTRAVENOUS at 08:11

## 2021-12-31 RX ADMIN — DOCUSATE SODIUM 100 MG: 100 CAPSULE, LIQUID FILLED ORAL at 20:42

## 2021-12-31 NOTE — ANESTHESIA POSTPROCEDURE EVALUATION
Patient: [place]    Procedure Summary     Date: 12/30/21 Room / Location:  [facility]  /  [facility]    Anesthesia Start: 0721 Anesthesia Stop: 0940    Procedure: NEPHRECTOMY RIGHT LAPAROSCOPIC WITH DAVINCI ROBOT (Right Abdomen) Diagnosis:     Surgeons: Giovani Rubio Jr., MD Provider: Guille Akbar MD    Anesthesia Type: general ASA Status: 3          Anesthesia Type: general    Vitals  Vitals Value Taken Time   /77 12/30/21 1020   Temp 97.2 °F (36.2 °C) 12/30/21 1020   Pulse 66 12/30/21 1027   Resp 18 12/30/21 1020   SpO2 96 % 12/30/21 1028   Vitals shown include unvalidated device data.        Anesthesia Post Evaluation

## 2021-12-31 NOTE — ANESTHESIA POSTPROCEDURE EVALUATION
Patient: [place]    Procedure Summary     Date: 12/30/21 Room / Location:  [facility]  [facility]    Anesthesia Start: 0721 Anesthesia Stop: 0940    Procedure: NEPHRECTOMY RIGHT LAPAROSCOPIC WITH DAVINCI ROBOT (Right Abdomen) Diagnosis:     Surgeons: Giovani Rubio Jr., MD Provider: Guille Akbar MD    Anesthesia Type: general ASA Status: 3          Anesthesia Type: general    Vitals  Vitals Value Taken Time   /77 12/30/21 1020   Temp 97.2 °F (36.2 °C) 12/30/21 1020   Pulse 66 12/30/21 1027   Resp 18 12/30/21 1020   SpO2 96 % 12/30/21 1028   Vitals shown include unvalidated device data.        Post Anesthesia Care and Evaluation    Patient location during evaluation: PACU  Patient participation: complete - patient participated  Level of consciousness: awake and alert  Pain management: adequate  Airway patency: patent  Anesthetic complications: No anesthetic complications  PONV Status: none  Cardiovascular status: hemodynamically stable and acceptable  Respiratory status: nonlabored ventilation, acceptable and nasal cannula  Hydration status: acceptable

## 2022-01-01 LAB
ALBUMIN SERPL-MCNC: 3.2 G/DL (ref 3.5–5.2)
ANION GAP SERPL CALCULATED.3IONS-SCNC: 10 MMOL/L (ref 5–15)
BACTERIA SPEC AEROBE CULT: ABNORMAL
BUN SERPL-MCNC: 29 MG/DL (ref 8–23)
BUN/CREAT SERPL: 16.8 (ref 7–25)
CALCIUM SPEC-SCNC: 8.3 MG/DL (ref 8.6–10.5)
CHLORIDE SERPL-SCNC: 104 MMOL/L (ref 98–107)
CO2 SERPL-SCNC: 23 MMOL/L (ref 22–29)
CREAT SERPL-MCNC: 1.73 MG/DL (ref 0.76–1.27)
DEPRECATED RDW RBC AUTO: 44 FL (ref 37–54)
ERYTHROCYTE [DISTWIDTH] IN BLOOD BY AUTOMATED COUNT: 13.3 % (ref 12.3–15.4)
GFR SERPL CREATININE-BSD FRML MDRD: 38 ML/MIN/1.73
GLUCOSE SERPL-MCNC: 96 MG/DL (ref 65–99)
GRAM STN SPEC: ABNORMAL
HCT VFR BLD AUTO: 36 % (ref 37.5–51)
HGB BLD-MCNC: 12.3 G/DL (ref 13–17.7)
ISOLATED FROM: ABNORMAL
MCH RBC QN AUTO: 31.2 PG (ref 26.6–33)
MCHC RBC AUTO-ENTMCNC: 34.2 G/DL (ref 31.5–35.7)
MCV RBC AUTO: 91.4 FL (ref 79–97)
PHOSPHATE SERPL-MCNC: 2.7 MG/DL (ref 2.5–4.5)
PLATELET # BLD AUTO: 153 10*3/MM3 (ref 140–450)
PMV BLD AUTO: 8.9 FL (ref 6–12)
POTASSIUM SERPL-SCNC: 4.4 MMOL/L (ref 3.5–5.2)
RBC # BLD AUTO: 3.94 10*6/MM3 (ref 4.14–5.8)
SODIUM SERPL-SCNC: 137 MMOL/L (ref 136–145)
WBC NRBC COR # BLD: 10.84 10*3/MM3 (ref 3.4–10.8)

## 2022-01-01 PROCEDURE — 80069 RENAL FUNCTION PANEL: CPT | Performed by: INTERNAL MEDICINE

## 2022-01-01 PROCEDURE — 85027 COMPLETE CBC AUTOMATED: CPT | Performed by: UROLOGY

## 2022-01-01 PROCEDURE — 99232 SBSQ HOSP IP/OBS MODERATE 35: CPT | Performed by: INTERNAL MEDICINE

## 2022-01-01 RX ADMIN — ACETAMINOPHEN 650 MG: 325 TABLET, FILM COATED ORAL at 08:16

## 2022-01-01 RX ADMIN — DOCUSATE SODIUM 100 MG: 100 CAPSULE, LIQUID FILLED ORAL at 08:16

## 2022-01-01 RX ADMIN — DOCUSATE SODIUM 100 MG: 100 CAPSULE, LIQUID FILLED ORAL at 19:57

## 2022-01-01 NOTE — PROGRESS NOTES
"Urology    Patient Name: [place]  Medical Record Number: [id number]  YOB: 1940     LOS: 5 days   Patient Care Team:  Provider, No Known as PCP - General    Chief Complaint:    Chief Complaint   Patient presents with   • Blood in Urine       Subjective     Interval History:     He feels reasonably well, just sore.  He is ambulating with physical therapy.  He had a small bowel movement.  He is tolerating regular diet.  He denies nausea.    Review of Systems:    The following systems were reviewed and negative;  constitution, respiratory and cardiovascular    Objective     Vital Signs  /72 (BP Location: Right arm, Patient Position: Lying)   Pulse 78   Temp 97.7 °F (36.5 °C) (Oral)   Resp 16   Ht 167.6 cm (66\")   Wt 79.4 kg (175 lb)   SpO2 93%   BMI 28.25 kg/m²   I/O last 3 completed shifts:  In: 1245 [P.O.:1245]  Out: 1300 [Urine:1300]  No intake/output data recorded.      Physical Exam:  General Appearance: No acute distress, sitting up in bed, pleasant, appears comfortable  Lungs: Respirations regular, even and  unlabored  Heart: Regular rhythm and normal rate  Abdomen: Soft, nondistended, incisions intact without erythema or drainage  Genital: White catheter intact with clear yellow urine     Results Review:     I reviewed the patient's new clinical results.  Lab Results (last 24 hours)     Procedure Component Value Units Date/Time    Renal Function Panel [[id number]]  (Abnormal) Collected: 01/01/22 0523    Specimen: Blood Updated: 01/01/22 0711     Glucose 96 mg/dL      BUN 29 mg/dL      Creatinine 1.73 mg/dL      Sodium 137 mmol/L      Potassium 4.4 mmol/L      Chloride 104 mmol/L      CO2 23.0 mmol/L      Calcium 8.3 mg/dL      Albumin 3.20 g/dL      Phosphorus 2.7 mg/dL      Anion Gap 10.0 mmol/L      BUN/Creatinine Ratio 16.8     eGFR Non African Amer 38 mL/min/1.73     Narrative:      GFR Normal >60  Chronic Kidney Disease <60  Kidney Failure <15      CBC (No Diff) [[id number]] "  (Abnormal) Collected: 01/01/22 0523    Specimen: Blood Updated: 01/01/22 0621     WBC 10.84 10*3/mm3      RBC 3.94 10*6/mm3      Hemoglobin 12.3 g/dL      Hematocrit 36.0 %      MCV 91.4 fL      MCH 31.2 pg      MCHC 34.2 g/dL      RDW 13.3 %      RDW-SD 44.0 fl      MPV 8.9 fL      Platelets 153 10*3/mm3     Blood Culture - Blood, Hand, Right [[id number]]  (Normal) Collected: 12/30/21 1259    Specimen: Blood from Hand, Right Updated: 12/31/21 1400     Blood Culture No growth at 24 hours    Blood Culture - Blood, Arm, Left [[id number]]  (Normal) Collected: 12/30/21 1254    Specimen: Blood from Arm, Left Updated: 12/31/21 1400     Blood Culture No growth at 24 hours    Potassium [[id number]]  (Normal) Collected: 12/31/21 0949    Specimen: Blood Updated: 12/31/21 1027     Potassium 4.4 mmol/L      Comment: Slight hemolysis detected by analyzer. Results may be affected.             Medication Review:    Current Facility-Administered Medications:   •  acetaminophen (TYLENOL) tablet 650 mg, 650 mg, Oral, Q4H PRN, 650 mg at 01/01/22 0816 **OR** acetaminophen (TYLENOL) 160 MG/5ML solution 650 mg, 650 mg, Oral, Q4H PRN **OR** acetaminophen (TYLENOL) suppository 650 mg, 650 mg, Rectal, Q4H PRN, Giovani Rubio Jr., MD  •  bacitracin ointment 1 application, 1 application, Topical, Q12H PRN, Giovani Rubio Jr., MD  •  docusate sodium (COLACE) capsule 100 mg, 100 mg, Oral, BID, Giovani Rubio Jr., MD, 100 mg at 01/01/22 0816  •  HYDROcodone-acetaminophen (NORCO) 5-325 MG per tablet 1 tablet, 1 tablet, Oral, Q6H PRN, Daren Hardwick MD  •  HYDROmorphone (DILAUDID) injection 0.25 mg, 0.25 mg, Intravenous, Q2H PRN, Giovani Rubio Jr., MD, 0.25 mg at 12/31/21 0352  •  lactated ringers infusion, 9 mL/hr, Intravenous, Continuous, Giovani Rubio Jr., MD  •  magnesium sulfate 4 gram infusion - Mg less than or equal to 1mg/dL, 4 g, Intravenous, PRN **OR** magnesium sulfate 3 gram infusion (1gm x 3) - Mg  1.1 - 1.5 mg/dL, 1 g, Intravenous, PRN **OR** Magnesium Sulfate 2 gram infusion- Mg 1.6 - 1.9 mg/dL, 2 g, Intravenous, PRN, Giovani Rubio Jr., MD    Assessment and Plan    81-year-old male with a history of metastatic renal cell cancer, gross hematuria, and chronic kidney disease.  He underwent robotic assisted laparoscopic right radical nephrectomy 12/30/2021.     He is stable postoperative day #2.  The renal function improved.  He has good urine output.  He is tolerating a regular diet.     Plan: PERLA White.  Ambulate as tolerated.  Continue physical therapy.  Incentive spirometry.  Monitor renal function.      Metastatic renal cell carcinoma (HCC)    Gross hematuria    CKD (chronic kidney disease) stage 3, GFR 30-59 ml/min (HCC)    Right renal mass          Plan for disposition:Where: home and When:  TILA Apodaca MD  01/01/22  08:50 EST

## 2022-01-01 NOTE — PROGRESS NOTES
"   LOS: 5 days    Patient Care Team:  Provider, No Known as PCP - General    Reason For Visit:  F/U CKD  Subjective           Review of Systems:    Pulm: No soa   CV:  No CP      Objective     docusate sodium, 100 mg, Oral, BID      lactated ringers, 9 mL/hr          Vital Signs:  Blood pressure 131/72, pulse 78, temperature 97.7 °F (36.5 °C), temperature source Oral, resp. rate 16, height 167.6 cm (66\"), weight 79.4 kg (175 lb), SpO2 93 %.    Flowsheet Rows      First Filed Value   Admission Height 167.6 cm (66\") Documented at 12/25/2021 2244   Admission Weight 79.4 kg (175 lb) Documented at 12/25/2021 2244          12/31 0701 - 01/01 0700  In: 525 [P.O.:525]  Out: 1300 [Urine:1300]    Physical Exam:    General Appearance: NAD, alert and cooperative, Ox3  Eyes: PER, conjunctivae and sclerae normal, no icterus  Lungs: respirations regular and unlabored, no crepitus, clear to auscultation  Heart/CV: regular rhythm & normal rate, no murmur, no gallop, no rub and no edema  Abdomen: not distended, soft, non-tender, no masses,  bowel sounds present  Skin: No rash, Warm and dry. [place]    Radiology:            Labs:  Results from last 7 days   Lab Units 01/01/22  0523 12/26/21  0109   WBC 10*3/mm3 10.84* 6.68   HEMOGLOBIN g/dL 12.3* 16.3   HEMATOCRIT % 36.0* 47.0   PLATELETS 10*3/mm3 153 145     Results from last 7 days   Lab Units 01/01/22  0523 12/31/21  0949 12/31/21  0648 12/29/21  0956 12/28/21  0602 12/28/21  0602 12/27/21  0321 12/26/21  0109 12/26/21  0109   SODIUM mmol/L 137  --  137 138  --  139  --    < > 137   POTASSIUM mmol/L 4.4 4.4 6.3* 4.2   < > 4.3  --    < > 4.6   CHLORIDE mmol/L 104  --  103 104  --  108*  --    < > 103   CO2 mmol/L 23.0  --  23.0 22.0  --  22.0  --    < > 24.0   BUN mg/dL 29*  --  27* 19  --  23  --    < > 34*   CREATININE mg/dL 1.73*  --  1.77* 1.93*  --  1.86*  --    < > 2.06*   CALCIUM mg/dL 8.3*  --  9.3 9.2  --  8.7  --    < > 9.1   PHOSPHORUS mg/dL 2.7  --  4.0  --   --  3.0  " --   --   --    MAGNESIUM mg/dL  --   --   --   --   --   --  2.1  --   --    ALBUMIN g/dL 3.20*  --  3.70  --   --  3.40*  --   --  3.90    < > = values in this interval not displayed.     Results from last 7 days   Lab Units 01/01/22  0523   GLUCOSE mg/dL 96       Results from last 7 days   Lab Units 12/26/21  0109   ALK PHOS U/L 70   BILIRUBIN mg/dL 0.6   ALT (SGPT) U/L 7   AST (SGOT) U/L 15           Results from last 7 days   Lab Units 12/26/21  0008   COLOR UA  Red*   CLARITY UA  Turbid*   PH, URINE  7.0   SPECIFIC GRAVITY, URINE  1.020   GLUCOSE UA  100 mg/dL (Trace)*   KETONES UA  15 mg/dL (1+)*   BILIRUBIN UA  Moderate (2+)*   PROTEIN UA  >=300 mg/dL (3+)*   BLOOD UA  Large (3+)*   LEUKOCYTES UA  Moderate (2+)*   NITRITE UA  Positive*       Estimated Creatinine Clearance: 33.7 mL/min (A) (by C-G formula based on SCr of 1.73 mg/dL (H)).      Assessment       Metastatic renal cell carcinoma (HCC)    Gross hematuria    CKD (chronic kidney disease) stage 3, GFR 30-59 ml/min (HCC)    Right renal mass            Impression: CKD. IMPROVED GFR DESPITE NEPHRECTOMY.            Recommendations: PRESENT CARE.      Anurag Loza MD  01/01/22  09:26 EST

## 2022-01-01 NOTE — PROGRESS NOTES
[facility] Medicine Services  PROGRESS NOTE    Patient Name: South Coffey  : 1940  MRN: [id number]    Date of Admission: 2021  Primary Care Physician: Provider, No Known    Subjective   Subjective     CC:   Hematuria for over a week    HPI:      Doing well.  Tolerating a diet, but looks to have eaten about 35% of breakfast.  Pain is generally well controlled.  No new issues.    ROS:  Gen- No fevers, chills  CV- No chest pain, palpitations  Resp- No cough, dyspnea  GI- No N/V/D, abd pain      Objective   Objective     Vital Signs:   Temp:  [97.7 °F (36.5 °C)-98.4 °F (36.9 °C)] 97.7 °F (36.5 °C)  Heart Rate:  [73-92] 78  Resp:  [16] 16  BP: (131-150)/(57-75) 131/72     Physical Exam:  Constitutional: No acute distress, awake, alert, lying in bed, age-appropriate  HENT: NCAT, mucous membranes moist  Respiratory: Clear to auscultation bilaterally, respiratory effort normal, on room air  Cardiovascular: RRR, no murmurs, rubs, or gallops  Gastrointestinal: Positive bowel sounds, soft, mild diffuse tenderness, incision and lap sites look good, unchanged from previous  Musculoskeletal: No bilateral ankle edema  Psychiatric: Appropriate affect, cooperative  Neurologic: Oriented x 3, no focal deficits  Skin: No rashes  : Catheter in place with clear yellow urine       Results Reviewed:  LAB RESULTS:      Lab 22  0523 21  0109   WBC 10.84* 6.68   HEMOGLOBIN 12.3* 16.3   HEMATOCRIT 36.0* 47.0   PLATELETS 153 145   NEUTROS ABS  --  4.38   IMMATURE GRANS (ABS)  --  0.04   LYMPHS ABS  --  1.49   MONOS ABS  --  0.59   EOS ABS  --  0.15   MCV 91.4 90.0   LACTATE  --  1.0         Lab 22  0523 21  0949 21  0648 21  0956 21  0602 21  0321 21  0109 21  0109   SODIUM 137  --  137 138 139  --   --  137   POTASSIUM 4.4 4.4 6.3* 4.2 4.3  --    < > 4.6   CHLORIDE 104  --  103 104 108*  --   --  103   CO2 23.0  --  23.0 22.0 22.0  --   --   24.0   ANION GAP 10.0  --  11.0 12.0 9.0  --   --  10.0   BUN 29*  --  27* 19 23  --   --  34*   CREATININE 1.73*  --  1.77* 1.93* 1.86*  --   --  2.06*   GLUCOSE 96  --  131* 125* 100*  --   --  107*   CALCIUM 8.3*  --  9.3 9.2 8.7  --   --  9.1   MAGNESIUM  --   --   --   --   --  2.1  --   --    PHOSPHORUS 2.7  --  4.0  --  3.0  --   --   --    TSH  --   --   --   --   --  3.300  --   --     < > = values in this interval not displayed.         Lab 01/01/22  0523 12/31/21  0648 12/28/21  0602 12/26/21  0109   TOTAL PROTEIN  --   --   --  6.0   ALBUMIN 3.20* 3.70 3.40* 3.90   GLOBULIN  --   --   --  2.1   ALT (SGPT)  --   --   --  7   AST (SGOT)  --   --   --  15   BILIRUBIN  --   --   --  0.6   ALK PHOS  --   --   --  70                 Lab 12/29/21  1815 12/29/21  1603 12/29/21  1603   ABO TYPING O   < > O   RH TYPING Negative   < > Negative   ANTIBODY SCREEN  --   --  Negative    < > = values in this interval not displayed.         Brief Urine Lab Results  (Last result in the past 365 days)      Color   Clarity   Blood   Leuk Est   Nitrite   Protein   CREAT   Urine HCG        12/27/21 1808             90.6               Microbiology Results Abnormal     Procedure Component Value - Date/Time    Blood Culture - Blood, Hand, Right [[id number]]  (Normal) Collected: 12/30/21 1259    Lab Status: Preliminary result Specimen: Blood from Hand, Right Updated: 12/31/21 1400     Blood Culture No growth at 24 hours    Blood Culture - Blood, Arm, Left [[id number]]  (Normal) Collected: 12/30/21 1254    Lab Status: Preliminary result Specimen: Blood from Arm, Left Updated: 12/31/21 1400     Blood Culture No growth at 24 hours    Blood Culture - Blood, Arm, Left [[id number]]  (Normal) Collected: 12/26/21 0240    Lab Status: Preliminary result Specimen: Blood from Arm, Left Updated: 12/29/21 0816     Blood Culture No growth at 3 days    Eosinophil Smear - Urine, Urine, Catheter [[id number]]  (Normal) Collected: 12/27/21 1808    Lab  Status: Final result Specimen: Urine, Catheter Updated: 12/27/21 1908     Eosinophil Smear 0 % EOS/100 Cells     Narrative:      No eosinophil seen    COVID PRE-OP / PRE-PROCEDURE SCREENING ORDER (NO ISOLATION) - Swab, Nasopharynx [[id number]]  (Normal) Collected: 12/26/21 0231    Lab Status: Final result Specimen: Swab from Nasopharynx Updated: 12/26/21 0309    Narrative:      The following orders were created for panel order COVID PRE-OP / PRE-PROCEDURE SCREENING ORDER (NO ISOLATION) - Swab, Nasopharynx.  Procedure                               Abnormality         Status                     ---------                               -----------         ------                     COVID-19 and FLU A/B PCR...[[id number]]  Normal              Final result                 Please view results for these tests on the individual orders.    COVID-19 and FLU A/B PCR - Swab, Nasopharynx [[id number]]  (Normal) Collected: 12/26/21 0231    Lab Status: Final result Specimen: Swab from Nasopharynx Updated: 12/26/21 0309     COVID19 Not Detected     Influenza A PCR Not Detected     Influenza B PCR Not Detected    Narrative:      Fact sheet for providers: https://www.fda.gov/media/536058/download    Fact sheet for patients: https://www.fda.gov/media/958824/download    Test performed by PCR.          No radiology results from the last 24 hrs        I have reviewed the medications:  Scheduled Meds:docusate sodium, 100 mg, Oral, BID      Continuous Infusions:lactated ringers, 9 mL/hr      PRN Meds:.•  acetaminophen **OR** acetaminophen **OR** acetaminophen  •  bacitracin  •  HYDROcodone-acetaminophen  •  HYDROmorphone  •  magnesium sulfate **OR** magnesium sulfate in D5W 1g/100mL (PREMIX) **OR** magnesium sulfate    Assessment/Plan   Assessment & Plan     Active Hospital Problems    Diagnosis  POA   • **Metastatic renal cell carcinoma (HCC) [C64.9]  Yes     Priority: Medium   • Right renal mass [N28.89]  Yes   • Gross hematuria [R31.0]  Yes    • CKD (chronic kidney disease) stage 3, GFR 30-59 ml/min ([facility]) [N18.30]  Yes      Resolved Hospital Problems   No resolved problems to display.        Brief Hospital Course to date:  South Coffey is a 81 y.o. male who presented to the ER with gross hematuria and was seen about 2 years ago with hematuria and concern for renal cell carcinoma he was instructed to follow-up with Dr. Hopper but was lost to follow-up.  He has now returned again for evaluation of persistent hematuria.  He has been seen by urology and started on CBI and multiple options for treatment have been discussed.  He was seen by palliative and hematology.        Gross Hematuria  -Related to mass  -Previously on CBI, now resolved postoperatively     Metastatic Renal Cell Carcinoma  Large Right Renal Mass  -Postoperative day #2 robotic assisted laparoscopic radical right nephrectomy and adrenalectomy  - lindsay out today per urology  -Postoperative care per urology  -continue advanced diet  - follow up with Oncology Dr Cooper for axitinib and Keytruda as an outpatient    Suspected CKD stage III  -Nuclear scan comments on essentially nonfunctioning right kidney  -Discussed with Dr. Loza, may take slight hit in GFR after nephrectomy, will monitor  -Renal function actually with some improvement post-op  - s/p dose of lokelma on 12/31 for hyperkalemia with good response      DVT prophylaxis:  Mechanical DVT prophylaxis orders are present.       AM-PAC 6 Clicks Score (PT): 19 (01/01/22 1850)    Disposition: I expect the patient to be discharged home tomorrow if ok with urology?    CODE STATUS:   Code Status and Medical Interventions:   Ordered at: 12/26/21 0329     Code Status (Patient has no pulse and is not breathing):    CPR (Attempt to Resuscitate)     Medical Interventions (Patient has pulse or is breathing):    Full Support       Daren Hardwick MD  01/01/22

## 2022-01-02 LAB
BH BB BLOOD EXPIRATION DATE: NORMAL
BH BB BLOOD EXPIRATION DATE: NORMAL
BH BB BLOOD TYPE BARCODE: 9500
BH BB BLOOD TYPE BARCODE: 9500
BH BB DISPENSE STATUS: NORMAL
BH BB DISPENSE STATUS: NORMAL
BH BB PRODUCT CODE: NORMAL
BH BB PRODUCT CODE: NORMAL
BH BB UNIT NUMBER: NORMAL
BH BB UNIT NUMBER: NORMAL
CROSSMATCH INTERPRETATION: NORMAL
CROSSMATCH INTERPRETATION: NORMAL
UNIT  ABO: NORMAL
UNIT  ABO: NORMAL
UNIT  RH: NORMAL
UNIT  RH: NORMAL

## 2022-01-02 PROCEDURE — 99232 SBSQ HOSP IP/OBS MODERATE 35: CPT | Performed by: NURSE PRACTITIONER

## 2022-01-02 RX ADMIN — DOCUSATE SODIUM 100 MG: 100 CAPSULE, LIQUID FILLED ORAL at 08:13

## 2022-01-02 RX ADMIN — ACETAMINOPHEN 650 MG: 325 TABLET, FILM COATED ORAL at 08:13

## 2022-01-02 RX ADMIN — DOCUSATE SODIUM 100 MG: 100 CAPSULE, LIQUID FILLED ORAL at 19:44

## 2022-01-02 NOTE — PROGRESS NOTES
"Urology    Patient Name: South Coffey  Medical Record Number: [id number]  YOB: 1940     LOS: 6 days   Patient Care Team:  Provider, No Known as PCP - General    Chief Complaint:    Chief Complaint   Patient presents with   • Blood in Urine       Subjective     Interval History:     He feels well.  He is tolerating a regular diet.  He is voiding well.  He is ambulating with assistance.  He still feels weak and is not confident about going home today.    Review of Systems:    The following systems were reviewed and negative;  constitution, respiratory, cardiovascular, gastrointestinal and genitourinary    Objective     Vital Signs  /82 (BP Location: Right arm, Patient Position: Lying)   Pulse 64   Temp 97.6 °F (36.4 °C) (Oral)   Resp 18   Ht 167.6 cm (66\")   Wt 79.4 kg (175 lb)   SpO2 95%   BMI 28.25 kg/m²   I/O last 3 completed shifts:  In: 525 [P.O.:525]  Out: 1850 [Urine:1850]  I/O this shift:  In: 240 [P.O.:240]  Out: 100 [Urine:100]      Physical Exam:  General Appearance: No acute distress, sitting up in bed, pleasant, appears comfortable  Lungs: Respirations regular, even and  unlabored  Abdomen: Soft, nondistended, incisions intact without erythema or drainage  Genital: Urine yellow in the hand-held urinal     Results Review:     I reviewed the patient's new clinical results.  Lab Results (last 24 hours)     Procedure Component Value Units Date/Time    Blood Culture - Blood, Arm, Left [[id number]]  (Normal) Collected: 12/30/21 1254    Specimen: Blood from Arm, Left Updated: 01/01/22 1400     Blood Culture No growth at 2 days    Blood Culture - Blood, Hand, Right [[id number]]  (Normal) Collected: 12/30/21 1259    Specimen: Blood from Hand, Right Updated: 01/01/22 1400     Blood Culture No growth at 2 days    Blood Culture - Blood, Arm, Right [[id number]]  (Abnormal) Collected: 12/26/21 0250    Specimen: Blood from Arm, Right Updated: 01/01/22 1242     Blood Culture Cutibacterium acnes "     Comment: Beta lactamase negative          Isolated from Anaerobic Bottle     Gram Stain Anaerobic Bottle Gram positive bacilli    Narrative:      Blood culture does not meet the specified criteria for PCR identification.  If pregnant, immunocompromised, or clinical concern for meningitis, call lab to run BCID for Listeria monocytogenes.          Medication Review:    Current Facility-Administered Medications:   •  acetaminophen (TYLENOL) tablet 650 mg, 650 mg, Oral, Q4H PRN, 650 mg at 01/02/22 0813 **OR** acetaminophen (TYLENOL) 160 MG/5ML solution 650 mg, 650 mg, Oral, Q4H PRN **OR** acetaminophen (TYLENOL) suppository 650 mg, 650 mg, Rectal, Q4H PRN, Giovani Rubio Jr., MD  •  docusate sodium (COLACE) capsule 100 mg, 100 mg, Oral, BID, Giovani Rubio Jr., MD, 100 mg at 01/02/22 0813  •  HYDROcodone-acetaminophen (NORCO) 5-325 MG per tablet 1 tablet, 1 tablet, Oral, Q6H PRN, Daren Hardwick MD  •  lactated ringers infusion, 9 mL/hr, Intravenous, Continuous, Giovani Rubio Jr., MD  •  magnesium sulfate 4 gram infusion - Mg less than or equal to 1mg/dL, 4 g, Intravenous, PRN **OR** magnesium sulfate 3 gram infusion (1gm x 3) - Mg 1.1 - 1.5 mg/dL, 1 g, Intravenous, PRN **OR** Magnesium Sulfate 2 gram infusion- Mg 1.6 - 1.9 mg/dL, 2 g, Intravenous, PRN, Giovani Rubio Jr., MD    Assessment and Plan    81-year-old male with a history of metastatic renal cell cancer, gross hematuria, and chronic kidney disease.  He underwent robotic assisted laparoscopic right radical nephrectomy 12/30/2021 by Dr. Rubio.     He is doing well postoperative day #3.  He is voiding well and has good urine output.  He is tolerating a regular diet.  He is ambulating with assistance but still feels weak.     Plan: Ambulate as tolerated.  Continue physical therapy.  Incentive spirometry.  Monitor renal function.  Hopefully home tomorrow or to rehabilitation.        Metastatic renal cell carcinoma (HCC)    Gross  hematuria    CKD (chronic kidney disease) stage 3, GFR 30-59 ml/min (HCC)    Right renal mass          Plan for disposition:Where: home and When:  TBVANCE Apodaca MD  01/02/22  11:47 EST

## 2022-01-02 NOTE — PROGRESS NOTES
[facility] Medicine Services  PROGRESS NOTE    Patient Name: South Coffey  : 1940  MRN: [id number]    Date of Admission: 2021  Primary Care Physician: Provider, No Known    Subjective   Subjective     CC:   Hematuria for over a week    HPI:      Patient states he is feeling better. No further hematuria and urinating on his own. Still some pain and trouble with position changes. Weaker than normal. Doesn't feel ready to leave today and would like to try walking    ROS:  Gen- No fevers, chills  CV- No chest pain, palpitations  Resp- No cough, dyspnea  GI- No N/V/D, abd pain      Objective   Objective     Vital Signs:   Temp:  [97.6 °F (36.4 °C)-98.3 °F (36.8 °C)] 97.6 °F (36.4 °C)  Heart Rate:  [64-82] 64  Resp:  [16-18] 18  BP: (138-168)/(50-93) 168/82     Physical Exam:  Constitutional: No acute distress, awake, alert,sitting in bed  HENT: NCAT, mucous membranes moist  Respiratory: Clear to auscultation bilaterally, respiratory effort normal, on room air  Cardiovascular: RRR, no murmurs, rubs, or gallops  Gastrointestinal: Positive bowel sounds, soft, mild diffuse tenderness, incision and lap sites intact without drainage  Musculoskeletal: No bilateral ankle edema  Psychiatric: Appropriate affect, cooperative  Neurologic: Oriented x 3, no focal deficits  Skin: No rashes      Results Reviewed:  LAB RESULTS:      Lab 22   WBC 10.84*   HEMOGLOBIN 12.3*   HEMATOCRIT 36.0*   PLATELETS 153   MCV 91.4         Lab 22  0523 21  0949 21  0648 21  0956 21  0602 21  0321   SODIUM 137  --  137 138 139  --    POTASSIUM 4.4 4.4 6.3* 4.2 4.3  --    CHLORIDE 104  --  103 104 108*  --    CO2 23.0  --  23.0 22.0 22.0  --    ANION GAP 10.0  --  11.0 12.0 9.0  --    BUN 29*  --  27* 19 23  --    CREATININE 1.73*  --  1.77* 1.93* 1.86*  --    GLUCOSE 96  --  131* 125* 100*  --    CALCIUM 8.3*  --  9.3 9.2 8.7  --    MAGNESIUM  --   --   --   --    --  2.1   PHOSPHORUS 2.7  --  4.0  --  3.0  --    TSH  --   --   --   --   --  3.300         Lab 01/01/22  0523 12/31/21  0648 12/28/21  0602   ALBUMIN 3.20* 3.70 3.40*                 Lab 12/29/21  1815 12/29/21  1603 12/29/21  1603   ABO TYPING O   < > O   RH TYPING Negative   < > Negative   ANTIBODY SCREEN  --   --  Negative    < > = values in this interval not displayed.         Brief Urine Lab Results  (Last result in the past 365 days)      Color   Clarity   Blood   Leuk Est   Nitrite   Protein   CREAT   Urine HCG        12/27/21 1808             90.6               Microbiology Results Abnormal     Procedure Component Value - Date/Time    Blood Culture - Blood, Arm, Left [[id number]]  (Normal) Collected: 12/30/21 1254    Lab Status: Preliminary result Specimen: Blood from Arm, Left Updated: 01/01/22 1400     Blood Culture No growth at 2 days    Blood Culture - Blood, Hand, Right [[id number]]  (Normal) Collected: 12/30/21 1259    Lab Status: Preliminary result Specimen: Blood from Hand, Right Updated: 01/01/22 1400     Blood Culture No growth at 2 days    Blood Culture - Blood, Arm, Left [[id number]]  (Normal) Collected: 12/26/21 0240    Lab Status: Preliminary result Specimen: Blood from Arm, Left Updated: 12/29/21 0816     Blood Culture No growth at 3 days    Eosinophil Smear - Urine, Urine, Catheter [[id number]]  (Normal) Collected: 12/27/21 1808    Lab Status: Final result Specimen: Urine, Catheter Updated: 12/27/21 1908     Eosinophil Smear 0 % EOS/100 Cells     Narrative:      No eosinophil seen    COVID PRE-OP / PRE-PROCEDURE SCREENING ORDER (NO ISOLATION) - Swab, Nasopharynx [[id number]]  (Normal) Collected: 12/26/21 0231    Lab Status: Final result Specimen: Swab from Nasopharynx Updated: 12/26/21 0309    Narrative:      The following orders were created for panel order COVID PRE-OP / PRE-PROCEDURE SCREENING ORDER (NO ISOLATION) - Swab, Nasopharynx.  Procedure                               Abnormality          Status                     ---------                               -----------         ------                     COVID-19 and FLU A/B PCR...[[id number]]  Normal              Final result                 Please view results for these tests on the individual orders.    COVID-19 and FLU A/B PCR - Swab, Nasopharynx [[id number]]  (Normal) Collected: 12/26/21 0231    Lab Status: Final result Specimen: Swab from Nasopharynx Updated: 12/26/21 0309     COVID19 Not Detected     Influenza A PCR Not Detected     Influenza B PCR Not Detected    Narrative:      Fact sheet for providers: https://www.fda.gov/media/370501/download    Fact sheet for patients: https://www.fda.gov/media/816769/download    Test performed by PCR.          No radiology results from the last 24 hrs        I have reviewed the medications:  Scheduled Meds:docusate sodium, 100 mg, Oral, BID      Continuous Infusions:lactated ringers, 9 mL/hr      PRN Meds:.•  acetaminophen **OR** acetaminophen **OR** acetaminophen  •  HYDROcodone-acetaminophen  •  HYDROmorphone  •  magnesium sulfate **OR** magnesium sulfate in D5W 1g/100mL (PREMIX) **OR** magnesium sulfate    Assessment/Plan   Assessment & Plan     Active Hospital Problems    Diagnosis  POA   • **Metastatic renal cell carcinoma (HCC) [C64.9]  Yes   • Right renal mass [N28.89]  Yes   • Gross hematuria [R31.0]  Yes   • CKD (chronic kidney disease) stage 3, GFR 30-59 ml/min (HCC) [N18.30]  Yes      Resolved Hospital Problems   No resolved problems to display.        Brief Hospital Course to date:  [place] Coffey is a 81 y.o. male who presented to the ER with gross hematuria and was seen about 2 years ago with hematuria and concern for renal cell carcinoma he was instructed to follow-up with Dr. Hopper but was lost to follow-up.  He has now returned again for evaluation of persistent hematuria.  He has been seen by urology and started on CBI and multiple options for treatment have been discussed.  He was seen by  palliative and hematology.       This patient's problems and plans were partially entered by my partner and updated as appropriate by me 01/02/22.      Gross Hematuria  -Related to mass  -Previously on CBI, now resolved postoperatively     Metastatic Renal Cell Carcinoma  Large Right Renal Mass  -Postoperative day #3 robotic assisted laparoscopic radical right nephrectomy and adrenalectomy  - lindsay out 1/1 per urology. Voiding well  -Postoperative care per urology  -continue advanced diet- low potassium  - follow up with Oncology Dr Cooper in ~ 3 weeks for axitinib and Keytruda as an outpatient    Suspected CKD stage III  -Nuclear scan comments on essentially nonfunctioning right kidney  -Discussed with Dr. Loaz, may take slight hit in GFR after nephrectomy -Renal function with some improvement post-op  - s/p dose of lokelma on 12/31 for hyperkalemia with good response  -continue low K+ diet      DVT prophylaxis:  Mechanical DVT prophylaxis orders are present.       AM-PAC 6 Clicks Score (PT): 18 (01/02/22 0814)    Disposition: I expect the patient to be discharged home tomorrow if ok with urology and able to ambulate ok. PT to see in am    CODE STATUS:   Code Status and Medical Interventions:   Ordered at: 12/26/21 0329     Code Status (Patient has no pulse and is not breathing):    CPR (Attempt to Resuscitate)     Medical Interventions (Patient has pulse or is breathing):    Full Support       Rebeca Medina, ALIZE  01/02/22

## 2022-01-02 NOTE — PROGRESS NOTES
"   LOS: 6 days    Patient Care Team:  Provider, No Known as PCP - General    Reason For Visit:  F/U CKD  Subjective           Review of Systems:    Pulm: No soa   CV:  No CP      Objective     docusate sodium, 100 mg, Oral, BID      lactated ringers, 9 mL/hr          Vital Signs:  Blood pressure 162/93, pulse 66, temperature 97.8 °F (36.6 °C), temperature source Oral, resp. rate 18, height 167.6 cm (66\"), weight 79.4 kg (175 lb), SpO2 96 %.    Flowsheet Rows      First Filed Value   Admission Height 167.6 cm (66\") Documented at 12/25/2021 2244   Admission Weight 79.4 kg (175 lb) Documented at 12/25/2021 2244          01/01 0701 - 01/02 0700  In: -   Out: 1250 [Urine:1250]    Physical Exam:    General Appearance: NAD, alert and cooperative, Ox3  Eyes: PER, conjunctivae and sclerae normal, no icterus  Lungs: respirations regular and unlabored, no crepitus, clear to auscultation  Heart/CV: regular rhythm & normal rate, no murmur, no gallop, no rub and no edema  Abdomen: not distended, soft, non-tender, no masses,  bowel sounds present  Skin: No rash, Warm and dry    Radiology:            Labs:  Results from last 7 days   Lab Units 01/01/22  0523   WBC 10*3/mm3 10.84*   HEMOGLOBIN g/dL 12.3*   HEMATOCRIT % 36.0*   PLATELETS 10*3/mm3 153     Results from last 7 days   Lab Units 01/01/22  0523 12/31/21  0949 12/31/21  0648 12/29/21  0956 12/28/21  0602 12/28/21  0602 12/27/21  0321   SODIUM mmol/L 137  --  137 138  --  139  --    POTASSIUM mmol/L 4.4 4.4 6.3* 4.2   < > 4.3  --    CHLORIDE mmol/L 104  --  103 104  --  108*  --    CO2 mmol/L 23.0  --  23.0 22.0  --  22.0  --    BUN mg/dL 29*  --  27* 19  --  23  --    CREATININE mg/dL 1.73*  --  1.77* 1.93*  --  1.86*  --    CALCIUM mg/dL 8.3*  --  9.3 9.2  --  8.7  --    PHOSPHORUS mg/dL 2.7  --  4.0  --   --  3.0  --    MAGNESIUM mg/dL  --   --   --   --   --   --  2.1   ALBUMIN g/dL 3.20*  --  3.70  --   --  3.40*  --     < > = values in this interval not displayed. "     Results from last 7 days   Lab Units 01/01/22  0523   GLUCOSE mg/dL 96                       Estimated Creatinine Clearance: 33.7 mL/min (A) (by C-G formula based on SCr of 1.73 mg/dL (H)).      Assessment       Metastatic renal cell carcinoma (HCC)    Gross hematuria    CKD (chronic kidney disease) stage 3, GFR 30-59 ml/min (HCC)    Right renal mass            Impression: CKD WITH NOW SOLITARY KIDNEY. ANEMIA.            Recommendations: LAB AM.      Anurag Loza MD  01/02/22  13:15 EST

## 2022-01-03 ENCOUNTER — READMISSION MANAGEMENT (OUTPATIENT)
Dept: CALL CENTER | Facility: HOSPITAL | Age: 82
End: 2022-01-03

## 2022-01-03 ENCOUNTER — TELEPHONE (OUTPATIENT)
Dept: ONCOLOGY | Facility: CLINIC | Age: 82
End: 2022-01-03

## 2022-01-03 VITALS
BODY MASS INDEX: 28.12 KG/M2 | OXYGEN SATURATION: 94 % | WEIGHT: 175 LBS | TEMPERATURE: 97.8 F | DIASTOLIC BLOOD PRESSURE: 55 MMHG | HEIGHT: 66 IN | HEART RATE: 70 BPM | SYSTOLIC BLOOD PRESSURE: 140 MMHG | RESPIRATION RATE: 18 BRPM

## 2022-01-03 PROBLEM — N28.89 RIGHT RENAL MASS: Status: RESOLVED | Noted: 2021-12-27 | Resolved: 2022-01-03

## 2022-01-03 PROBLEM — Z90.5 SOLITARY KIDNEY, ACQUIRED: Status: ACTIVE | Noted: 2022-01-03

## 2022-01-03 PROBLEM — Z90.5 S/P NEPHRECTOMY: Status: ACTIVE | Noted: 2022-01-03

## 2022-01-03 PROBLEM — R31.0 GROSS HEMATURIA: Status: RESOLVED | Noted: 2021-12-26 | Resolved: 2022-01-03

## 2022-01-03 LAB
DEPRECATED RDW RBC AUTO: 43.6 FL (ref 37–54)
ERYTHROCYTE [DISTWIDTH] IN BLOOD BY AUTOMATED COUNT: 13.4 % (ref 12.3–15.4)
HCT VFR BLD AUTO: 38.2 % (ref 37.5–51)
HGB BLD-MCNC: 13.2 G/DL (ref 13–17.7)
MCH RBC QN AUTO: 30.8 PG (ref 26.6–33)
MCHC RBC AUTO-ENTMCNC: 34.6 G/DL (ref 31.5–35.7)
MCV RBC AUTO: 89.3 FL (ref 79–97)
PLATELET # BLD AUTO: 197 10*3/MM3 (ref 140–450)
PMV BLD AUTO: 8.6 FL (ref 6–12)
RBC # BLD AUTO: 4.28 10*6/MM3 (ref 4.14–5.8)
WBC NRBC COR # BLD: 10.13 10*3/MM3 (ref 3.4–10.8)

## 2022-01-03 PROCEDURE — 97530 THERAPEUTIC ACTIVITIES: CPT

## 2022-01-03 PROCEDURE — 99231 SBSQ HOSP IP/OBS SF/LOW 25: CPT | Performed by: INTERNAL MEDICINE

## 2022-01-03 PROCEDURE — 99239 HOSP IP/OBS DSCHRG MGMT >30: CPT | Performed by: PHYSICIAN ASSISTANT

## 2022-01-03 PROCEDURE — 97116 GAIT TRAINING THERAPY: CPT

## 2022-01-03 PROCEDURE — 85027 COMPLETE CBC AUTOMATED: CPT | Performed by: NURSE PRACTITIONER

## 2022-01-03 RX ORDER — ACETAMINOPHEN 325 MG/1
650 TABLET ORAL EVERY 4 HOURS PRN
Start: 2022-01-03

## 2022-01-03 RX ORDER — PSEUDOEPHEDRINE HCL 30 MG
200 TABLET ORAL DAILY PRN
Qty: 30 EACH | Refills: 3 | Status: SHIPPED | OUTPATIENT
Start: 2022-01-03

## 2022-01-03 RX ADMIN — DOCUSATE SODIUM 100 MG: 100 CAPSULE, LIQUID FILLED ORAL at 08:20

## 2022-01-03 RX ADMIN — ACETAMINOPHEN 650 MG: 325 TABLET, FILM COATED ORAL at 08:20

## 2022-01-03 NOTE — CASE MANAGEMENT/SOCIAL WORK
Case Management Discharge Note      Final Note:       Mr. Coffey is medically ready for discharge today.  Physical therapy recommends home with home health at discharge and a rolling walker. I spoke to Mr. Coffey and his wife Heather at the bedside today, both are agreeable to this discharge plan.     Upon admission, Mr. Coffey denied having a primary provider. Upon further discussion regarding medicare requiring a PCP for home health, Heather recolected that Mr. Coffey has seen her primary provider in the past. This information was changed in Epic.     Mr. Coffey and his wife Heather will be staying with their son Albert in [facility] for a few weeks. [facility] was arranged at the [facility].    I spoke to Court with [employer] who will deliver a rolling walker to Mr. Coffey's bedside today. Neither Mr. Coffey nor his wife Heather have identified any further discharge needs.           Durable Medical Equipment Coordination complete.    Service Provider Selected Services Address Phone Fax Patient Preferred    [facility]  Durable Medical [facility] [address] [address] [phone] --       Internal Comment last updated by Farhana Irizarry, RN 1/3/2022 1411    Rolling Walker                              Home Medical Care Coordination complete.    Service Provider Selected Services Address Phone Fax Patient Preferred    [facility]  Home Health Services [address] [address] [phone] --       Internal Comment last updated by Farhana Irizarry, RN 1/3/2022 8626    First session is tomorrow, 1/4/22                               Final Discharge Disposition Code: 06 - home with home health care     Transportation: Car

## 2022-01-03 NOTE — PROGRESS NOTES
"HEMATOLOGY/ONCOLOGY PROGRESS NOTE    S: S/p nephrectomy by Dr. Rubio.  Doing reasonably well.  Plan for discharge today.        Past medical history, social history and family history was reviewed and unchanged from prior visit.    Review of Systems:    Review of Systems   Constitutional: Negative.    HENT: Negative.    Eyes: Negative.    Respiratory: Negative.    Cardiovascular: Negative.    Gastrointestinal: Negative.    Endocrine: Negative.    Genitourinary: Negative.    Musculoskeletal: Negative.    Allergic/Immunologic: Negative.    Neurological: Negative.    Hematological: Negative.    Psychiatric/Behavioral: Negative.             Medications:  The current medication list was reviewed in the EMR    ALLERGIES:  No Known Allergies      Physical Exam    VITAL SIGNS:  /55 (BP Location: Right arm, Patient Position: Sitting)   Pulse 70   Temp 97.8 °F (36.6 °C) (Oral)   Resp 18   Ht 167.6 cm (66\")   Wt 79.4 kg (175 lb)   SpO2 94%   BMI 28.25 kg/m²   Temp:  [97.5 °F (36.4 °C)-98 °F (36.7 °C)] 97.8 °F (36.6 °C)      Performance Status:1                Physical Exam    General: well appearing, in no acute distress  HEENT: sclerae anicteric, oropharynx clear, neck is supple  Skin: no rashes, lesions, bruising, or petechiae  Msk:  Shows no weakness of the large muscle groups  Psych: Mood is stable  Hematuria still present       RECENT LABS:    Lab Results   Component Value Date    HGB 13.2 01/03/2022    HCT 38.2 01/03/2022    MCV 89.3 01/03/2022     01/03/2022    WBC 10.13 01/03/2022    NEUTROABS 4.38 12/26/2021    LYMPHSABS 1.49 12/26/2021    MONOSABS 0.59 12/26/2021    EOSABS 0.15 12/26/2021    BASOSABS 0.03 12/26/2021       Lab Results   Component Value Date    GLUCOSE 96 01/01/2022    BUN 29 (H) 01/01/2022    CREATININE 1.73 (H) 01/01/2022     01/01/2022    K 4.4 01/01/2022     01/01/2022    CO2 23.0 01/01/2022    CALCIUM 8.3 (L) 01/01/2022    PROTEINTOT 6.0 12/26/2021    ALBUMIN " 3.20 (L) 01/01/2022    BILITOT 0.6 12/26/2021    ALKPHOS 70 12/26/2021    AST 15 12/26/2021    ALT 7 12/26/2021         Assessment/Plan    1.  Metastatic renal cell carcinoma.  I will likely start him on treatment in about 3 weeks.  I am plan to treat him with axitinib and Keytruda.  Awaiting final path to make sure this is a clear cell variety.  We will have him follow-up with me on the same day that he sees Dr. Rubio as an outpatient.            Otf Cooper MD  [facility]ematology [facility] On[facility]    1/3/2022

## 2022-01-03 NOTE — THERAPY TREATMENT NOTE
Patient Name: [place]  : 1940    MRN: [id number]                              Today's Date: 1/3/2022       Admit Date: 2021    Visit Dx:     ICD-10-CM ICD-9-CM   1. Right renal mass  N28.89 593.9   2. Gross hematuria  R31.0 599.71   3. Acute renal failure, unspecified acute renal failure type (HCC)  N17.9 584.9   4. Pulmonary nodules  R91.8 793.19   5. Acute UTI  N39.0 599.0   6. Abnormal kidney function  N28.9 593.9   7. Metastatic renal cell carcinoma (HCC)  C64.9 189.0     199.1   8. Right renal mass  N28.89 593.9     Patient Active Problem List   Diagnosis   • Metastatic renal cell carcinoma (HCC)   • Gross hematuria   • CKD (chronic kidney disease) stage 3, GFR 30-59 ml/min (HCC)   • Right renal mass     Past Medical History:   Diagnosis Date   • Dextrocardia      Past Surgical History:   Procedure Laterality Date   • GALLBLADDER SURGERY     • HERNIA REPAIR     • NEPHRECTOMY Right 2021    Procedure: NEPHRECTOMY RIGHT LAPAROSCOPIC WITH DAVINCI ROBOT;  Surgeon: Giovani Rubio Jr., MD;  Location: [facility];  Service: Robotics - DaVinci;  Laterality: Right;      General Information     Row Name 22 1139          Physical Therapy Time and Intention    Document Type therapy note (daily note)  -CD     Mode of Treatment physical therapy  -CD     Row Name 22 1139          General Information    Patient Profile Reviewed yes  -CD     Existing Precautions/Restrictions fall  s/p nephrectomy/adrenalectomy due to metastatic renal cell CA.  -CD     Barriers to Rehab medically complex; previous functional deficit  -CD     Row Name 22 1139          Living Environment    Lives With spouse  -CD     Row Name 22 1139          Cognition    Orientation Status (Cognition) oriented x 4  -CD     Row Name 22 1139          Safety Issues, Functional Mobility    Impairments Affecting Function (Mobility) balance; endurance/activity tolerance; strength  -CD     Comment, Safety  Issues/Impairments (Mobility) SLOW TO RESPOND TO COMMANDS.  -CD           User Key  (r) = Recorded By, (t) = Taken By, (c) = Cosigned By    Initials Name Provider Type    Purvi Gomez PT Physical Therapist               Mobility     Row Name 01/03/22 1140          Bed Mobility    Supine-Sit Reading (Bed Mobility) contact guard; verbal cues  -CD     Assistive Device (Bed Mobility) bed rails; head of bed elevated  -CD     Comment (Bed Mobility) CUES TO LOG ROLL. INCREASED TIME AND EFFORT AND UTILIZING BEDRAIL.  -CD     Row Name 01/03/22 1140          Transfers    Comment (Transfers) CUES FOR HAND PLACEMENT AND SAFETY WITH R WALKER.  -CD     Row Name 01/03/22 1140          Sit-Stand Transfer    Sit-Stand Reading (Transfers) standby assist  -CD     Assistive Device (Sit-Stand Transfers) walker, front-wheeled  -CD     Row Name 01/03/22 1140          Gait/Stairs (Locomotion)    Reading Level (Gait) standby assist; verbal cues  -CD     Assistive Device (Gait) walker, front-wheeled  -CD     Distance in Feet (Gait) 420 FEET.  -CD     Deviations/Abnormal Patterns (Gait) gait speed decreased; blanquita decreased  -CD     Bilateral Gait Deviations forward flexed posture; heel strike decreased  -CD     Comment (Gait/Stairs) CUES FOR UPRIGHT POSTURE. RECOMMEND R WALKER AT D/C.  -CD           User Key  (r) = Recorded By, (t) = Taken By, (c) = Cosigned By    Initials Name Provider Type    Purvi Gomez PT Physical Therapist               Obj/Interventions    No documentation.                Goals/Plan    No documentation.                Clinical Impression     Row Name 01/03/22 1149          Plan of Care Review    Plan of Care Reviewed With patient  -CD     Progress improving  -CD     Outcome Summary PT DEMONSTRATES IMPROVE BED MOBILITY. INCREASED DISTANCE AMBULATED  FEET WITH R WALKER AND SBA. RECOMMEND HOME WITH FAMILY, R WALKER AND HHPT.  -CD     Row Name 01/03/22 114          Therapy  Assessment/Plan (PT)    Patient/Family Therapy Goals Statement (PT) TO GO HOME.  -CD     Rehab Potential (PT) good, to achieve stated therapy goals  -CD     Criteria for Skilled Interventions Met (PT) yes  -CD     Row Name 01/03/22 1142          Vital Signs    Post Systolic BP Rehab 168  -CD     Posttreatment Heart Rate (beats/min) 65  -CD     O2 Delivery Pre Treatment room air  -CD     O2 Delivery Intra Treatment room air  -CD     O2 Delivery Post Treatment room air  -CD     Pre Patient Position Supine  -CD     Intra Patient Position Standing  -CD     Post Patient Position Sitting  -CD     Row Name 01/03/22 1142          Positioning and Restraints    Pre-Treatment Position in bed  -CD     Post Treatment Position chair  -CD     In Chair reclined; call light within reach; encouraged to call for assist; exit alarm on; legs elevated; notified nsg  -CD           User Key  (r) = Recorded By, (t) = Taken By, (c) = Cosigned By    Initials Name Provider Type    Purvi Gomez, PT Physical Therapist               Outcome Measures     Row Name 01/03/22 1146 01/03/22 0800       How much help from another person do you currently need...    Turning from your back to your side while in flat bed without using bedrails? 3  -CD 4  -AT    Moving from lying on back to sitting on the side of a flat bed without bedrails? 3  -CD 4  -AT    Moving to and from a bed to a chair (including a wheelchair)? 3  -CD 3  -AT    Standing up from a chair using your arms (e.g., wheelchair, bedside chair)? 4  -CD 4  -AT    Climbing 3-5 steps with a railing? 3  -CD 3  -AT    To walk in hospital room? 3  -CD 3  -AT    AM-PAC 6 Clicks Score (PT) 19  -CD 21  -AT    Row Name 01/03/22 1146          Functional Assessment    Outcome Measure Options AM-PAC 6 Clicks Basic Mobility (PT)  -CD           User Key  (r) = Recorded By, (t) = Taken By, (c) = Cosigned By    Initials Name Provider Type    Purvi Gomez, PT Physical Therapist    AT [place]  RACHEL Kerns Registered Nurse                             Physical Therapy Education                 Title: PT OT SLP Therapies (Done)     Topic: Physical Therapy (Done)     Point: Mobility training (Done)     Learning Progress Summary           Patient Acceptance, E, VU by CD at 1/3/2022 1147    Comment: SEE FLOWSHEET.    Acceptance, E, VU,NR by CD at 12/31/2021 1628    Comment: BENEFITS OF OOB ACTIVITY, SAFETY WITH MOBILITY, PROGRESSION OF POC, D/C PLANNING,   Family Acceptance, E, VU,NR by CD at 12/31/2021 1628    Comment: BENEFITS OF OOB ACTIVITY, SAFETY WITH MOBILITY, PROGRESSION OF POC, D/C PLANNING,   Significant Other Acceptance, E, VU,NR by CD at 12/31/2021 1628    Comment: BENEFITS OF OOB ACTIVITY, SAFETY WITH MOBILITY, PROGRESSION OF POC, D/C PLANNING,                   Point: Home exercise program (Done)     Learning Progress Summary           Patient Acceptance, E, VU by CD at 1/3/2022 1147    Comment: SEE FLOWSHEET.    Acceptance, E, VU,NR by CD at 12/31/2021 1628    Comment: BENEFITS OF OOB ACTIVITY, SAFETY WITH MOBILITY, PROGRESSION OF POC, D/C PLANNING,   Family Acceptance, E, VU,NR by CD at 12/31/2021 1628    Comment: BENEFITS OF OOB ACTIVITY, SAFETY WITH MOBILITY, PROGRESSION OF POC, D/C PLANNING,   Significant Other Acceptance, E, VU,NR by CD at 12/31/2021 1628    Comment: BENEFITS OF OOB ACTIVITY, SAFETY WITH MOBILITY, PROGRESSION OF POC, D/C PLANNING,                   Point: Body mechanics (Done)     Learning Progress Summary           Patient Acceptance, E, VU by CD at 1/3/2022 1147    Comment: SEE FLOWSHEET.    Acceptance, E, VU,NR by CD at 12/31/2021 1628    Comment: BENEFITS OF OOB ACTIVITY, SAFETY WITH MOBILITY, PROGRESSION OF POC, D/C PLANNING,   Family Acceptance, E, VU,NR by CD at 12/31/2021 1628    Comment: BENEFITS OF OOB ACTIVITY, SAFETY WITH MOBILITY, PROGRESSION OF POC, D/C PLANNING,   Significant Other Acceptance, E, VU,NR by CD at 12/31/2021 1628    Comment: BENEFITS OF OOB  ACTIVITY, SAFETY WITH MOBILITY, PROGRESSION OF POC, D/C PLANNING,                   Point: Precautions (Done)     Learning Progress Summary           Patient Acceptance, E, VU by CD at 1/3/2022 1147    Comment: SEE FLOWSHEET.    Acceptance, E, VU,NR by CD at 12/31/2021 1628    Comment: BENEFITS OF OOB ACTIVITY, SAFETY WITH MOBILITY, PROGRESSION OF POC, D/C PLANNING,   Family Acceptance, E, VU,NR by CD at 12/31/2021 1628    Comment: BENEFITS OF OOB ACTIVITY, SAFETY WITH MOBILITY, PROGRESSION OF POC, D/C PLANNING,   Significant Other Acceptance, E, VU,NR by CD at 12/31/2021 1628    Comment: BENEFITS OF OOB ACTIVITY, SAFETY WITH MOBILITY, PROGRESSION OF POC, D/C PLANNING,                               User Key     Initials Effective Dates Name Provider Type Discipline    CD 06/16/21 -  Purvi Balbuena PT Physical Therapist PT              PT Recommendation and Plan  Planned Therapy Interventions (PT): balance training, bed mobility training, gait training, transfer training, stair training, strengthening  Plan of Care Reviewed With: patient  Progress: improving  Outcome Summary: PT DEMONSTRATES IMPROVE BED MOBILITY. INCREASED DISTANCE AMBULATED  FEET WITH R WALKER AND SBA. RECOMMEND HOME WITH FAMILY, R WALKER AND HHPT.     Time Calculation:    PT Charges     Row Name 01/03/22 1147             Time Calculation    Start Time 1110  -CD      PT Received On 01/03/22  -CD      PT Goal Re-Cert Due Date 01/10/22  -CD              Time Calculation- PT    Total Timed Code Minutes- PT 23 minute(s)  -CD              Timed Charges    86281 - Gait Training Minutes  15  -CD      77680 - PT Therapeutic Activity Minutes 8  -CD              Total Minutes    Timed Charges Total Minutes 23  -CD       Total Minutes 23  -CD            User Key  (r) = Recorded By, (t) = Taken By, (c) = Cosigned By    Initials Name Provider Type    CD Purvi Balbuena, PT Physical Therapist              Therapy Charges for Today     Code Description  Service Date Service Provider Modifiers Qty    93028246503  GAIT TRAINING EA 15 MIN 1/3/2022 Purvi Balbuena, PT GP 1    05174612254 HC PT THERAPEUTIC ACT EA 15 MIN 1/3/2022 Purvi Balbuena, PT GP 1          PT G-Codes  Outcome Measure Options: AM-PAC 6 Clicks Basic Mobility (PT)  AM-PAC 6 Clicks Score (PT): 19    Purvi Balbuena, PT  1/3/2022

## 2022-01-03 NOTE — DISCHARGE SUMMARY
"      [facility] Medicine Services  DISCHARGE SUMMARY    Patient Name: South Coffey  : 1940  MRN: [id number]    Date of Admission: 2021 10:50 PM  Date of Discharge: 1/3/2022  Primary Care Physician: Provider, No Known    Consults     Date and Time Order Name Status Description    2021 12:35 AM Inpatient Nephrology Consult Completed     2021  3:38 AM Inpatient Palliative Care MD Consult Completed     2021  3:31 AM Inpatient Hematology & Oncology Consult      2021  3:31 AM Inpatient Urology Consult Completed         Hospital Course     Presenting Problem:   Metastatic cancer (HCC) [C79.9]  Right renal mass [N28.89]    Active Hospital Problems    Diagnosis  POA   • **Metastatic renal cell carcinoma (HCC) [C64.9]  Yes   • S/p right nephrectomy 21 by Dr. 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 South Coffey is an 81yoM without known medical history. He does not have a PCP. He presented to [facility] ED on 21 for evaluation of gross hematuria. He reported a known \"kidney nodule\" x 15 years. He had been seen at [facility] ED in May 2019 for hematuria and at that time, CT abdomen/pelvis showed an 8cm R renal mass concerning for malignancy. Per documentation, he was scheduled to follow up with Dr. Hopper of urology but it appears he was lost to follow up, as he did not recall ever being told he needed further evaluation. Since that time, he had been having off-and-on hematuria that usually self-resolved. On , however, he felt that it was a substantial amount of blood and was concerned, prompting his presentation.     CT imaging in the ED " "was suggestive of diffusely metastatic renal cell carcinoma. MRI brain was negative for involvement. He was admitted to the hospital medicine service. Oncology, urology, nephrology and palliative care teams were consulted. Urology recommended cytoreductive nephrectomy followed by chemotherapy under the direction of oncology. Dr. Lieberman following and recommends starting medications in the upcoming weeks.     He underwent laparoscopic right radical nephrectomy with adrenalectomy by Dr. Rubio on 12/30/21. He tolerated this procedure well. Renal function remains stable and he is felt to now have CKD III with solitary L kidney.   PT/OT have evaluated and recommend home with HH services.     He will follow up with Dr. Rubio and Dr. Lieberman in 2 weeks.     Day of Discharge     HPI:   Sitting in chair. Feeling okay today. No chest pain or dyspnea. Says he is \"sore\" but overall, pain is manageable. Only utilizing tylenol for pain, RN says he has declined anything stronger. Urinating well. Bowels moving.     Review of Systems  Gen- No fevers, chills  CV- No chest pain, palpitations  Resp- No cough, dyspnea  GI- No N/V/D, (+) post-surgical abdominal pain is controlled     Vital Signs:   Temp:  [97.5 °F (36.4 °C)-98 °F (36.7 °C)] 97.8 °F (36.6 °C)  Heart Rate:  [49-86] 70  Resp:  [18] 18  BP: (130-160)/(55-94) 140/55     Physical Exam:  Constitutional: No acute distress, awake, alert and conversant. Sitting up in chair   HENT: NCAT, mucous membranes moist  Respiratory: Clear to auscultation bilaterally, respiratory effort normal   Cardiovascular: RRR, no murmurs, rubs, or gallops  Gastrointestinal: Positive bowel sounds, soft, appropriately tender. Midline abdominal incision and laparoscopic incision x 2 are c/d/i without erythema, edema or induration.   Musculoskeletal: No bilateral ankle edema  Psychiatric: Appropriate affect, cooperative  Neurologic: Oriented x 3, moves all extremities spontaneously, speech clear    Pertinent "  and/or Most Recent Results     LAB RESULTS:      Lab 01/03/22  1000 01/01/22  0523   WBC 10.13 10.84*   HEMOGLOBIN 13.2 12.3*   HEMATOCRIT 38.2 36.0*   PLATELETS 197 153   MCV 89.3 91.4         Lab 01/01/22  0523 12/31/21  0949 12/31/21  0648 12/29/21  0956 12/28/21  0602   SODIUM 137  --  137 138 139   POTASSIUM 4.4 4.4 6.3* 4.2 4.3   CHLORIDE 104  --  103 104 108*   CO2 23.0  --  23.0 22.0 22.0   ANION GAP 10.0  --  11.0 12.0 9.0   BUN 29*  --  27* 19 23   CREATININE 1.73*  --  1.77* 1.93* 1.86*   GLUCOSE 96  --  131* 125* 100*   CALCIUM 8.3*  --  9.3 9.2 8.7   PHOSPHORUS 2.7  --  4.0  --  3.0     Microbiology Results (last 10 days)     Procedure Component Value - Date/Time    Blood Culture - Blood, Hand, Right [[id number]]  (Normal) Collected: 12/30/21 1259    Lab Status: Preliminary result Specimen: Blood from Hand, Right Updated: 01/02/22 1400     Blood Culture No growth at 3 days    Blood Culture - Blood, Arm, Left [[id number]]  (Normal) Collected: 12/30/21 1254    Lab Status: Preliminary result Specimen: Blood from Arm, Left Updated: 01/02/22 1400     Blood Culture No growth at 3 days    Eosinophil Smear - Urine, Urine, Catheter [[id number]]  (Normal) Collected: 12/27/21 1808    Lab Status: Final result Specimen: Urine, Catheter Updated: 12/27/21 1908     Eosinophil Smear 0 % EOS/100 Cells     Narrative:      No eosinophil seen    Blood Culture - Blood, Arm, Right [[id number]]  (Abnormal) Collected: 12/26/21 0250    Lab Status: Final result Specimen: Blood from Arm, Right Updated: 01/01/22 1242     Blood Culture Cutibacterium acnes     Comment: Beta lactamase negative          Isolated from Anaerobic Bottle     Gram Stain Anaerobic Bottle Gram positive bacilli    Narrative:      Blood culture does not meet the specified criteria for PCR identification.  If pregnant, immunocompromised, or clinical concern for meningitis, call lab to run BCID for Listeria monocytogenes.    Blood Culture - Blood, Arm, Left  [[id number]]  (Normal) Collected: 12/26/21 0240    Lab Status: Preliminary result Specimen: Blood from Arm, Left Updated: 12/29/21 0816     Blood Culture No growth at 3 days    COVID PRE-OP / PRE-PROCEDURE SCREENING ORDER (NO ISOLATION) - Swab, Nasopharynx [[id number]]  (Normal) Collected: 12/26/21 0231    Lab Status: Final result Specimen: Swab from Nasopharynx Updated: 12/26/21 0309    Narrative:      The following orders were created for panel order COVID PRE-OP / PRE-PROCEDURE SCREENING ORDER (NO ISOLATION) - Swab, Nasopharynx.  Procedure                               Abnormality         Status                     ---------                               -----------         ------                     COVID-19 and FLU A/B PCR...[[id number]]  Normal              Final result                 Please view results for these tests on the individual orders.    COVID-19 and FLU A/B PCR - Swab, Nasopharynx [[id number]]  (Normal) Collected: 12/26/21 0231    Lab Status: Final result Specimen: Swab from Nasopharynx Updated: 12/26/21 0309     COVID19 Not Detected     Influenza A PCR Not Detected     Influenza B PCR Not Detected    Narrative:      Fact sheet for providers: https://www.fda.gov/media/838397/download    Fact sheet for patients: https://www.fda.gov/media/603891/download    Test performed by PCR.    Urine Culture - Urine, Urine, Clean Catch [[id number]]  (Abnormal)  (Susceptibility) Collected: 12/26/21 0008    Lab Status: Final result Specimen: Urine, Clean Catch Updated: 12/28/21 0732     Urine Culture 25,000 CFU/mL Escherichia coli    Susceptibility      Escherichia coli     EILEEN     Ampicillin Susceptible     Ampicillin + Sulbactam Susceptible     Cefazolin Susceptible     Cefepime Susceptible     Ceftazidime Susceptible     Ceftriaxone Susceptible     Gentamicin Susceptible     Levofloxacin Susceptible     Nitrofurantoin Susceptible     Piperacillin + Tazobactam Susceptible     Trimethoprim + Sulfamethoxazole  Susceptible                             CT Abdomen Pelvis Without Contrast    Result Date: 12/26/2021  CT Abdomen Pelvis WO INDICATION: Hematuria. TECHNIQUE: CT of the abdomen and pelvis without IV contrast. Coronal and sagittal reconstructions were obtained.  Radiation dose reduction techniques included automated exposure control or exposure modulation based on body size. Count of known CT and cardiac nuc med studies performed in previous 12 months: 0. COMPARISON: 5/15/2019 FINDINGS: Exam is motion degraded. There are numerous noncalcified pulmonary nodules in the lung bases that are new from prior, likely the result of metastatic disease. For example, a right lower lobe nodule measures about 8 mm in size. Gallbladder is surgically absent. No biliary obstruction is seen. Abdominal aorta is normal in caliber. Again seen is a large malignant mass in the upper pole of the right kidney. This has enlarged, now measuring about 8.9 x 11.5 cm, previously about 7.8 x 8.4 cm. There is a new exophytic right lower renal mass measuring 2.3 x 2.6 cm. Findings are consistent with renal cell carcinoma. There is adjacent neovascularity in the soft tissues. There is high density material in the right renal pelvis which probably reflects clot based on history although tumor is not completely excluded. Left kidney is grossly normal. There are no ureteral stones on either side. Remaining solid organs are grossly normal. Prostate gland is enlarged. There is a large fat-containing right inguinal hernia. Patient is likely status post left inguinal hernia repair. Urinary bladder is normal except for the anterior wall extends toward the right inguinal hernia. There is no evidence of acute colitis. The appendix is normal. No small bowel obstruction is seen. Stomach is normal. There are some mildly prominent retroperitoneal lymph nodes on the right which may reflect metastatic disease. The largest measures about 2.3 x 1.2 cm. No definite  suspicious osseous lesions are seen. There is lumbar degenerative disease with levoscoliosis.     1. Interval enlargement of a malignant right upper pole renal mass with interval development of a new right lower pole renal mass. Findings are compatible with renal cell carcinoma. 2. There is metastatic disease in both lung bases. 3. High density material in the right renal pelvis probably reflects some clot although tumor is not excluded. The left kidney is normal. 4. Retroperitoneal adenopathy may reflect metastatic disease. 5. No acute findings in the GI tract. Normal appendix. 6. Additional findings as above. Signer Name: Naveed Ventura MD  Signed: 12/26/2021 12:47 AM  Workstation Name: [facility]    CT Chest Without Contrast Diagnostic    Result Date: 12/27/2021  EXAMINATION: CT CHEST WO CONTRAST DIAGNOSTIC - 12/26/2021  INDICATION: N28.89-Other specified disorders of kidney and ureter; R31.0-Gross hematuria; N17.9-Acute kidney failure, unspecified; R91.8-Other nonspecific abnormal finding of lung field; N39.0-Urinary tract infection, site not specified. Metastatic renal cell carcinoma staging.  TECHNIQUE: 5 mm unenhanced images through the chest and upper abdomen.  The radiation dose reduction device was turned on for each scan per the ALARA (As Low as Reasonably Achievable) protocol.  COMPARISON: None.  FINDINGS: The heart and mediastinum are somewhat deviated to the right, but appear morphologically normal, with aortic arch descending on the left. No pericardial effusion, mediastinal, hilar or axillary adenopathy is seen. There is no pleural effusion. Lung window images show at least a dozen scattered small pulmonary nodules, mostly of the lower lungs, the largest of which is approximately 9 mm in the right lower lobe adjacent the right hemidiaphragm. Remaining nodules are generally much smaller. No pneumonia or other active pulmonary parenchymal disease is seen elsewhere.  Bony structures appear to be intact.  As on the patient's abdominal CT scan of same day, there is a large right upper pole renal mass.      1. Scattered small round pulmonary parenchymal nodules, mostly of the lower lungs, suspicious for hematogenous spread of metastatic disease.  2. No evidence of active chest disease elsewhere.  DICTATED:   12/26/2021 EDITED/lfs:   12/26/2021   This report was finalized on 12/27/2021 12:59 PM by Dr. Dwight Galindo MD.      MRI Brain Without Contrast    Result Date: 12/27/2021  EXAMINATION: MRI BRAIN WO CONTRAST - 12/26/2021  INDICATION: N28.89-Other specified disorders of kidney and ureter; R31.0-Gross hematuria; N17.9-Acute kidney failure, unspecified; R91.8-Other nonspecific abnormal finding of lung field; N39.0-Urinary tract infection, site not specified. Staging metastatic renal cell carcinoma.  TECHNIQUE: Sagittal and axial T1 and axial T2 FLAIR and diffusion-weighted images of the brain, axial T2 susceptibility-weighted series.  COMPARISON: None.  FINDINGS: No restricted diffusion is seen to suggest acute infarction. There is no evidence of mass, mass effect or edema and no significant white matter disease. There is expected degree of generalized cerebral atrophy for the patient's age. No signal changes suggestive of hemorrhage is identified. Expected flow signal in the major intracranial arteries and in the median sagittal sinus. No gross abnormalities are seen of the orbits, paranasal sinuses, or mastoids. Sagittal images show the midline structures to appear grossly normal.      No evidence of intracranial metastatic disease or other acute intracranial disease.  DICTATED:   12/26/2021 EDITED/lfs:   12/26/2021   This report was finalized on 12/27/2021 12:13 AM by Dr. Dwight Galindo MD.      NM Renal With Flow & Function Without Pharmacological Intervention    Result Date: 1/1/2022  EXAMINATION: NM RENAL W FLOW AND FUNCTION WO PHARMACOLOGICAL INTERVENTION-  INDICATION: Needs right  nephrectomy, smaller left kidney, need percent FXN in both kidneys; N28.89-Other specified disorders of kidney and ureter; R31.0-Gross hematuria; N17.9-Acute kidney failure, unspecified; R91.8-Other nonspecific abnormal finding of lung field; N39.0-Urinary tract infection, site not specified.  TECHNIQUE: Radiopharmaceutical: 10.3 mCi of technetium 99m MAG3, IV.  COMPARISON: Abdominal CT scan 12/26/2021.  FINDINGS: Film images show fairly symmetric blood flow to both kidneys, however, there is only normal concentration and excretion from the left kidney, with good washout of activity and no evidence of obstructive uropathy. Right kidney is seen as a persistent vascular blush with no excretion, no evidence of any collecting system activity or ureteral activity.  Reprocessed images show 70% activity on the left and 30% on the right for renal cortical counts, 68% left and 33% right for whole kidney counts. The right renal activity appears to largely activity within the patient's large tumor.      Essentially nonfunctioning right kidney, largely replaced by tumor. No visible excretion of contrast. Normal concentration and excretion of contrast by the left kidney.  D:  12/30/2021 E:  12/30/2021  This report was finalized on 1/1/2022 6:42 PM by Dr. Dwight Galindo MD.      US Renal Limited    Result Date: 12/28/2021  EXAMINATION: US RENAL LIMITED- 12/27/2021  INDICATION: VIKTOR; N28.89-Other specified disorders of kidney and ureter; R31.0-Gross hematuria; N17.9-Acute kidney failure, unspecified; R91.8-Other nonspecific abnormal finding of lung field; N39.0-Urinary tract infection, site not specified  TECHNIQUE: Ultrasound kidneys and urinary bladder  COMPARISON: CT dated 12/26/2021  FINDINGS:  Right kidney measures 14.4 cm in length with an abnormal exophytic margination soft tissue mass appearing component measuring up to 8 cm as seen on recent CT with internal flow consistent with mass concerning for malignancy without overt  hydronephrosis despite areas of echogenicity and shadowing of nonobstructing nephrolithiasis.  Left kidney unremarkable at 9.3 cm without hydronephrosis, contour deforming mass or obvious calculi.  Urinary bladder is largely decompressed and unremarkable.      Right superior pole 8 cm renal mass with lobulated margins better seen on recent CT. Nonobstructing right nephrolithiasis.   D:  12/28/2021 E:  12/28/2021  This report was finalized on 12/28/2021 12:24 PM by Dr. Jama Vanegas.      Peripheral Block    Result Date: 12/30/2021  Chino Maldonado CRNA     12/30/2021  7:37 AM Peripheral Block Patient reassessed immediately prior to procedure Patient location during procedure: OR Reason for block: at surgeon's request and post-op pain management Performed by CRNA: Ander Bennett CRNA Preanesthetic Checklist Completed: patient identified, IV checked, site marked, risks and benefits discussed, surgical consent, monitors and equipment checked, pre-op evaluation and timeout performed Prep: Pt Position: supine Sterile barriers:cap, gloves, sterile barriers and mask Prep: ChloraPrep Patient monitoring: blood pressure monitoring, continuous pulse oximetry and EKG Procedure Sedation: yes Performed under: general Guidance:ultrasound guided Images:still images obtained, printed/placed on chart Laterality:Bilateral Block Type:TAP Injection Technique:single-shot Needle Type:short-bevel and echogenic Needle Gauge:20 G Resistance on Injection: none Medications Used: dexamethasone sodium phosphate injection, 4 mg bupivacaine PF (MARCAINE) 0.25 % injection, 60 mL Med administered at 12/30/2021 7:30 AM Medications Preservative Free Saline:10ml Comment:Block Injection:  LA dose divided between Right and Left block Post Assessment Injection Assessment: negative aspiration for heme, incremental injection and no paresthesia on injection Patient Tolerance:comfortable throughout block Complications:no Additional Notes   Under Ultrasound  guidance, [facility] was advanced with Normal Saline hydro dissection of tissue.  The Internal Oblique and Transversus Abdominus muscles where visualized.  At or before the aponeurosis of Internal Oblique, local anesthetic spread was visualized in the Transversus Abdominus Plane. Injection was made incrementally with aspiration every 5 mls.  There was no  intravascular injection,  injection pressure was normal, there was no neural injection, and the procedure was completed without difficulty.  Thank You.       Discharge Details        Discharge Medications      New Medications      Instructions Start Date   acetaminophen 325 MG tablet  Commonly known as: TYLENOL   650 mg, Oral, Every 4 Hours PRN      docusate sodium 100 MG capsule   200 mg, Oral, Daily PRN           No Known Allergies    Discharge Disposition:  Home or Self Care    Diet:  Diet Instructions     Diet: Regular; Thin      Discharge Diet: Regular    Fluid Consistency: Thin        Activity:  Activity Instructions     Other Activity Instructions      No lifting more than 5 -10 lbs until cleared by your surgeon (usually 4-6 weeks)     You may shower normally. Do not scrub or soak your incisions   Cover your incisions as needed for drainage. Do not leave covered at all times - leave open to air when able   Contact your surgeon if you develop increased pain, redness, swelling or purulent/foul-smelling discharge        CODE STATUS:    Code Status and Medical Interventions:   Ordered at: 12/26/21 0329     Code Status (Patient has no pulse and is not breathing):    CPR (Attempt to Resuscitate)     Medical Interventions (Patient has pulse or is breathing):    Full Support     No future appointments.    Additional Instructions for the Follow-ups that You Need to Schedule     Discharge Follow-up with Specified Provider: Dr. Cooper (oncology) in 2 weeks (he is in office Mon / Wed / Fri)   As directed      To: Dr. Cooper (oncology) in  2 weeks (he is in office Mon / Wed / Fri)         Discharge Follow-up with Specified Provider: Dr. Rubio (urology) in 2 weeks. Same day as Dr. Cooper if possible   As directed      To: Dr. Rubio (urology) in 2 weeks. Same day as Dr. Cooper if possible             Devika Mondragon PA-C  01/03/22    Time Spent on Discharge:  I spent 35 minutes on this discharge activity which included: face-to-face encounter with the patient, reviewing the data in the system, coordination of the care with the nursing staff as well as consultants, documentation, and entering orders.

## 2022-01-03 NOTE — TELEPHONE ENCOUNTER
Caller: SREEKANTH    Relationship: [facility]    Best call back number: [phone]    What was the call regarding: SREEKANTH CALLED TO SEE IF DR. BURNS WOULD FILL THE ORDERS FOR HOME HEALTH. SHE SAYS [facility] PCP IS NOT LOCAL ENOUGH FOR THAT.    Do you require a callback: YES

## 2022-01-03 NOTE — PROGRESS NOTES
Doing well this am  Tolerating diet  Ambulating    Abd benign with mild ecchymosis      Hopefully home soon  Path pending

## 2022-01-03 NOTE — TELEPHONE ENCOUNTER
Called Patient's home health at this time. Discussed with home health that Dr. Lieberman will sign orders. Home health will sign orders once patient is evaluated.

## 2022-01-04 LAB
BACTERIA SPEC AEROBE CULT: NORMAL
BACTERIA SPEC AEROBE CULT: NORMAL

## 2022-01-04 NOTE — OUTREACH NOTE
Prep Survey      Responses   [facility] facility patient discharged from? [place]   Is LACE score < 7 ? No   Emergency Room discharge w/ pulse ox? No   Eligibility Readm Mgmt   Discharge diagnosis Nephrectomy right lap    Does the patient have one of the following disease processes/diagnoses(primary or secondary)? General Surgery   Does the patient have Home health ordered? No   Is there a DME ordered? Yes   What DME was ordered? [employer] for home oxygen   Prep survey completed? Yes          Darshana Barcenas RN

## 2022-01-06 ENCOUNTER — SPECIALTY PHARMACY (OUTPATIENT)
Dept: ONCOLOGY | Facility: HOSPITAL | Age: 82
End: 2022-01-06

## 2022-01-06 ENCOUNTER — READMISSION MANAGEMENT (OUTPATIENT)
Dept: CALL CENTER | Facility: HOSPITAL | Age: 82
End: 2022-01-06

## 2022-01-06 DIAGNOSIS — C64.1 RENAL CELL CARCINOMA OF RIGHT KIDNEY METASTATIC TO OTHER SITE: Primary | ICD-10-CM

## 2022-01-06 NOTE — OUTREACH NOTE
General Surgery Week 1 Survey      Responses   [facility] patient discharged from? [place]   Does the patient have one of the following disease processes/diagnoses(primary or secondary)? General Surgery   Week 1 attempt successful? No   Unsuccessful attempts Attempt 1          Adrianne Beck RN

## 2022-01-06 NOTE — PROGRESS NOTES
Oral Chemotherapy - New Referral    Received a referral from Dr. Cooper     Medications: Inlyta (axitinib) and Keytruda (pembrolizumab)  Start date of treatment planned for: 1/26/22 is when Keytruda is scheduled for, goal is to obtain Inlyta by then so he can start at that time  Indication: Metastatic RCC  Relevant past treatments: Nephrectomy  Is the therapy appropriate based on treatment guidelines and FDA labeling?: Yes, this combo is approved for firstline use.  Therapeutic Goals: Continue treatment until progression or intolerable toxicity  Patient can self-administer oral medications: Yes    The current medication list was reviewed and there are no relevant drug-drug interactions. The patient has NKDA.  The patient's most recent labs were reviewed and all are WNL to start treatment at this dose.     A prescription was released to [facility] retail pharmacy for   Drug: Inlyta (axitinib)  Strength: 5 mg  Directions: Take 1 tablet PO BID  Quantity: 60  Refills: 5

## 2022-01-07 ENCOUNTER — SPECIALTY PHARMACY (OUTPATIENT)
Dept: ONCOLOGY | Facility: HOSPITAL | Age: 82
End: 2022-01-07

## 2022-01-07 ENCOUNTER — TELEPHONE (OUTPATIENT)
Dept: ONCOLOGY | Facility: CLINIC | Age: 82
End: 2022-01-07

## 2022-01-07 NOTE — TELEPHONE ENCOUNTER
Betty from [employer] is calling on behalf of this patient asking if the pts diagnosis has relapsed, advanced or is stage 4. Her call back number is [phone].

## 2022-01-07 NOTE — TELEPHONE ENCOUNTER
Tried to call [facility] back. Unable to get through. To inform Betty that patient is a stage 4. If well care please transfer to phone room so that call could be sent to nurse Johnson.

## 2022-01-07 NOTE — PROGRESS NOTES
Drug: axitinib  Strength: 5 mg tablet  Directions: Take 1 tablet PO BID  QTY: 60  RF:5    Released to pharmacy: [facility]    Completed independent double check on medication order/RX.

## 2022-01-10 ENCOUNTER — SPECIALTY PHARMACY (OUTPATIENT)
Dept: ONCOLOGY | Facility: HOSPITAL | Age: 82
End: 2022-01-10

## 2022-01-10 NOTE — PROGRESS NOTES
Specialty Pharmacy Refill Coordination Note     South is a 81 y.o. male contacted today regarding Axitinib 5mg PO 12HRS specialty medication(s).    Reviewed and verified with patient:  Allergies  Meds  Problems       Specialty medication(s) and dose(s) confirmed: yes      Patient will  medication from [facility] after education on 1/26/22.             Follow-up: 28 day(s)     Chely Trotter, Pharmacy Technician  Specialty Pharmacy Technician

## 2022-01-11 ENCOUNTER — READMISSION MANAGEMENT (OUTPATIENT)
Dept: CALL CENTER | Facility: HOSPITAL | Age: 82
End: 2022-01-11

## 2022-01-12 LAB — BACTERIA SPEC AEROBE CULT: NORMAL

## 2022-01-14 ENCOUNTER — READMISSION MANAGEMENT (OUTPATIENT)
Dept: CALL CENTER | Facility: HOSPITAL | Age: 82
End: 2022-01-14

## 2022-01-14 NOTE — OUTREACH NOTE
General Surgery Week 1 Survey      Responses   [facility] patient discharged from? [place]   Does the patient have one of the following disease processes/diagnoses(primary or secondary)? General Surgery   Week 1 attempt successful? No   Unsuccessful attempts Attempt 3          Tania Sky RN

## 2022-01-19 ENCOUNTER — OFFICE VISIT (OUTPATIENT)
Dept: ONCOLOGY | Facility: CLINIC | Age: 82
End: 2022-01-19

## 2022-01-19 VITALS
TEMPERATURE: 96.1 F | OXYGEN SATURATION: 97 % | DIASTOLIC BLOOD PRESSURE: 54 MMHG | SYSTOLIC BLOOD PRESSURE: 128 MMHG | HEART RATE: 66 BPM | HEIGHT: 66 IN | RESPIRATION RATE: 16 BRPM | BODY MASS INDEX: 27.59 KG/M2 | WEIGHT: 171.7 LBS

## 2022-01-19 DIAGNOSIS — C64.1 RENAL CELL CARCINOMA OF RIGHT KIDNEY METASTATIC TO OTHER SITE: Primary | ICD-10-CM

## 2022-01-19 PROCEDURE — 99215 OFFICE O/P EST HI 40 MIN: CPT | Performed by: INTERNAL MEDICINE

## 2022-01-19 NOTE — PROGRESS NOTES
"PROBLEM LIST:  Oncology/Hematology History   Metastatic renal cell carcinoma (HCC)   12/26/2021 Initial Diagnosis    Metastatic renal cell carcinoma (HCC)     1/3/2022 Cancer Staged    Staging form: Kidney, AJCC 8th Edition  - Pathologic stage from 1/3/2022: Stage IV (pT3, pN0, cM1) - Signed by Otf Cooper MD on 1/6/2022 1/26/2022 -  Chemotherapy    OP KIDNEY Axitinib / Pembrolizumab 200 mg         REASON FOR VISIT: Management of my kidney cancer    HISTORY OF PRESENT ILLNESS:   81 y.o.  male presents today for follow-up after undergoing a right nephrectomy by Dr. Pedro Rubio.  Clinically recovered from his surgery.  Still fairly hesitant about undergoing any systemic therapy.    Past medical history, social history and family history was reviewed 01/19/22 and unchanged from prior visit.    Review of Systems:    Review of Systems - Oncology         Medications:        Current Outpatient Medications:   •  acetaminophen (TYLENOL) 325 MG tablet, Take 2 tablets by mouth Every 4 (Four) Hours As Needed for Mild Pain  or Moderate Pain ., Disp: , Rfl:   •  [START ON 1/25/2022] axitinib (INLYTA) 5 MG chemo tablet, Take 1 tablet by mouth Every 12 (Twelve) Hours., Disp: 60 tablet, Rfl: 5  •  docusate sodium 100 MG capsule, Take 2 capsules by mouth Daily As Needed for Constipation., Disp: 30 each, Rfl: 3    Pain Medications             acetaminophen (TYLENOL) 325 MG tablet Take 2 tablets by mouth Every 4 (Four) Hours As Needed for Mild Pain  or Moderate Pain .             ALLERGIES:  No Known Allergies      Physical Exam    VITAL SIGNS:  /54   Pulse 66   Temp 96.1 °F (35.6 °C) (Temporal)   Resp 16   Ht 167.6 cm (66\")   Wt 77.9 kg (171 lb 11.2 oz)   SpO2 97%   BMI 27.71 kg/m²                 Wt Readings from Last 3 Encounters:   01/19/22 77.9 kg (171 lb 11.2 oz)   12/30/21 79.4 kg (175 lb)   05/15/19 81.6 kg (180 lb)       Body mass index is 27.71 kg/m². Body surface area is 1.87 meters " squared.    There were no vitals filed for this visit.        Performance Status: 0    General: well appearing, in no acute distress  HEENT: sclera anicteric, neck is supple  Lymphatics: no cervical, supraclavicular, or axillary adenopathy  Cardiovascular: regular rate and rhythm, no murmurs, rubs or gallops  Lungs: clear to auscultation bilaterally  Abdomen: soft, nontender, nondistended.  No palpable organomegaly  Extremities: no lower extremity edema  Skin: no rashes, lesions, bruising, or petechiae  Msk:  Shows no weakness of the large muscle groups  Psych: Mood is stable        RECENT LABS:    Lab Results   Component Value Date    HGB 13.2 01/03/2022    HCT 38.2 01/03/2022    MCV 89.3 01/03/2022     01/03/2022    WBC 10.13 01/03/2022    NEUTROABS 4.38 12/26/2021    LYMPHSABS 1.49 12/26/2021    MONOSABS 0.59 12/26/2021    EOSABS 0.15 12/26/2021    BASOSABS 0.03 12/26/2021       Lab Results   Component Value Date    GLUCOSE 96 01/01/2022    BUN 29 (H) 01/01/2022    CREATININE 1.73 (H) 01/01/2022     01/01/2022    K 4.4 01/01/2022     01/01/2022    CO2 23.0 01/01/2022    CALCIUM 8.3 (L) 01/01/2022    PROTEINTOT 6.0 12/26/2021    ALBUMIN 3.20 (L) 01/01/2022    BILITOT 0.6 12/26/2021    ALKPHOS 70 12/26/2021    AST 15 12/26/2021    ALT 7 12/26/2021     Lab Results   Component Value Date    FINALDX  12/30/2021     KIDNEY AND ADRENAL GLAND, RIGHT, RADICAL NEPHRECTOMY AND ADRENALECTOMY:  Clear-cell renal cell carcinoma, histologic grade G2  Tumor extends into perinephric tissue and renal sinus (pT3a)  Adrenal gland with no significant pathologic abnormality  Surgical margins negative for tumor  See CAP template below      KIDNEY  TYPE OF SPECIMEN/PROCEDURE:  Radical nephrectomy and adrenelectomy  SPECIMEN LATERALITY: Right  TUMOR SIZE: (MACROSCOPIC/MICROSCOPIC EXTENT OF TUMOR): 10.0 x 9.0 x 7.0 cm  TUMOR SITE: Upper pole  FOCALITY (UNIFOCAL, MULTIFOCAL):  Unifocal  HISTOLOGIC TYPE:  Clear cell renal  cell carcinoma  SARCOMATOID FEATURES: Not identified   RHABDOID FEATURES: Not identified  HISTOLOGIC GRADE (IUSP):  G2  TUMOR NECROSIS (SPECIFY PERCENTAGE): Present; 25%  TUMOR EXTENSION: Extends into perinephric tissue and into renal sinus  MICROSCOPIC VASCULAR/LYMPHVASCULAR INVASION:  Not identified  SURGICAL AND VASCULAR MARGIN INVOLVEMENT:  All margins negative for invasive carcinoma  REGIONAL LYMPH NODE STATUS:  No lymph nodes found or sumbitted  AJCC PATHOLOGIC STAGE:  (COMPLETED BY PATHOLOGIST, BASED ONLY ON TISSUE FINDINGS, MORE EXTENSIVE DISEASE MAY NOT BE KNOWN TO THE PATHOLOGIST)  pT=  pT3a  pN=  pNX           CT Abdomen Pelvis Without Contrast    Result Date: 12/26/2021  1. Interval enlargement of a malignant right upper pole renal mass with interval development of a new right lower pole renal mass. Findings are compatible with renal cell carcinoma. 2. There is metastatic disease in both lung bases. 3. High density material in the right renal pelvis probably reflects some clot although tumor is not excluded. The left kidney is normal. 4. Retroperitoneal adenopathy may reflect metastatic disease. 5. No acute findings in the GI tract. Normal appendix. 6. Additional findings as above. Signer Name: Naveed Ventura MD  Signed: 12/26/2021 12:47 AM  Workstation Name: [facility]  [facility]    CT Chest Without Contrast Diagnostic    Result Date: 12/27/2021  1. Scattered small round pulmonary parenchymal nodules, mostly of the lower lungs, suspicious for hematogenous spread of metastatic disease.  2. No evidence of active chest disease elsewhere.  DICTATED:   12/26/2021 EDITED/lfs:   12/26/2021   This report was finalized on 12/27/2021 12:59 PM by Dr. Dwight Galindo MD.      MRI Brain Without Contrast    Result Date: 12/27/2021  No evidence of intracranial metastatic disease or other acute intracranial disease.  DICTATED:   12/26/2021 EDITED/lfs:   12/26/2021   This report was finalized on  12/27/2021 12:13 AM by Dr. Dwight Galindo MD.      NM Renal With Flow & Function Without Pharmacological Intervention    Result Date: 1/1/2022  Essentially nonfunctioning right kidney, largely replaced by tumor. No visible excretion of contrast. Normal concentration and excretion of contrast by the left kidney.  D:  12/30/2021 E:  12/30/2021  This report was finalized on 1/1/2022 6:42 PM by Dr. Dwight Galindo MD.      US Renal Limited    Result Date: 12/28/2021  Right superior pole 8 cm renal mass with lobulated margins better seen on recent CT. Nonobstructing right nephrolithiasis.   D:  12/28/2021 E:  12/28/2021  This report was finalized on 12/28/2021 12:24 PM by Dr. Jama Vanegas.            Assessment/Plan    1.  Stage IV renal cell carcinoma with lung metastases.  His disease volume is fairly small.  Most of his disease was in his kidney which has been removed.  He has minimal nodules in his lungs bilaterally.  I discussed with him treatment options including axitinib and Keytruda.  I plan to start him next week.  However patient fairly hesitant about undergoing any treatment at this time.  Observation might be reasonable though we do not know what the velocity of his disease is.  If he decides against treatment plan to rescan him in early March.  I discussed the side effects and expectations of the disease with him and his family today.  Had a long discussion regarding observation alone.  Obviously with stage IV disease at this very difficult to see how this disease is going to progress going forward.        Total time of patient care on day of service including time prior to, face to face with patient, and following visit spent in reviewing records, lab results, imaging studies, discussion with patient, and documentation/charting was > 47 minutes.     Otf Cooper MD  [facility]    Return on: 02/16/22  Return in (Approximately): Schedule with next infusion, 1 month    No orders of  the defined types were placed in this encounter.      1/19/2022

## 2022-01-24 ENCOUNTER — TELEPHONE (OUTPATIENT)
Dept: ONCOLOGY | Facility: CLINIC | Age: 82
End: 2022-01-24

## 2022-01-24 ENCOUNTER — SPECIALTY PHARMACY (OUTPATIENT)
Dept: ONCOLOGY | Facility: HOSPITAL | Age: 82
End: 2022-01-24

## 2022-01-24 DIAGNOSIS — C64.1 RENAL CELL CARCINOMA OF RIGHT KIDNEY METASTATIC TO OTHER SITE: ICD-10-CM

## 2022-01-24 NOTE — PROGRESS NOTES
My prior note said I sent the Inlyta Rx to [facility] retail, however we cannot purchase [employer] products.  So I just sent the following to [facility] pharmacy for processing:    Inlyta (axitinib) 5 mg PO Q12H  #60  11 refills

## 2022-01-24 NOTE — TELEPHONE ENCOUNTER
Latesha from [facility] is calling about this patient, stating pt permantly moved to [facility] and no longer wanted their services. Lorrie's call back number is [phone] if anyone had additional questions.

## 2022-01-26 ENCOUNTER — DOCUMENTATION (OUTPATIENT)
Dept: NUTRITION | Facility: HOSPITAL | Age: 82
End: 2022-01-26

## 2022-01-26 ENCOUNTER — LAB (OUTPATIENT)
Dept: LAB | Facility: HOSPITAL | Age: 82
End: 2022-01-26

## 2022-01-26 ENCOUNTER — SPECIALTY PHARMACY (OUTPATIENT)
Dept: ONCOLOGY | Facility: HOSPITAL | Age: 82
End: 2022-01-26

## 2022-01-26 ENCOUNTER — HOSPITAL ENCOUNTER (OUTPATIENT)
Dept: ONCOLOGY | Facility: HOSPITAL | Age: 82
Setting detail: INFUSION SERIES
Discharge: HOME | End: 2022-01-26

## 2022-01-26 ENCOUNTER — HOSPITAL ENCOUNTER (OUTPATIENT)
Dept: ONCOLOGY | Facility: HOSPITAL | Age: 82
Discharge: HOME | End: 2022-01-26

## 2022-01-26 ENCOUNTER — OFFICE VISIT (OUTPATIENT)
Dept: ONCOLOGY | Facility: CLINIC | Age: 82
End: 2022-01-26

## 2022-01-26 VITALS
SYSTOLIC BLOOD PRESSURE: 132 MMHG | HEIGHT: 66 IN | RESPIRATION RATE: 16 BRPM | WEIGHT: 171.4 LBS | TEMPERATURE: 96.2 F | HEART RATE: 52 BPM | DIASTOLIC BLOOD PRESSURE: 63 MMHG | OXYGEN SATURATION: 96 % | BODY MASS INDEX: 27.55 KG/M2

## 2022-01-26 DIAGNOSIS — C64.1 RENAL CELL CARCINOMA OF RIGHT KIDNEY METASTATIC TO OTHER SITE: Primary | ICD-10-CM

## 2022-01-26 DIAGNOSIS — C64.1 RENAL CELL CARCINOMA OF RIGHT KIDNEY METASTATIC TO OTHER SITE: ICD-10-CM

## 2022-01-26 LAB
ALBUMIN SERPL-MCNC: 3.9 G/DL (ref 3.5–5.2)
ALBUMIN/GLOB SERPL: 1.8 G/DL
ALP SERPL-CCNC: 72 U/L (ref 39–117)
ALT SERPL W P-5'-P-CCNC: 11 U/L (ref 1–41)
ANION GAP SERPL CALCULATED.3IONS-SCNC: 7 MMOL/L (ref 5–15)
AST SERPL-CCNC: 14 U/L (ref 1–40)
BILIRUB SERPL-MCNC: 0.6 MG/DL (ref 0–1.2)
BILIRUB UR QL STRIP: NEGATIVE
BUN SERPL-MCNC: 23 MG/DL (ref 8–23)
BUN/CREAT SERPL: 12.7 (ref 7–25)
CALCIUM SPEC-SCNC: 9.3 MG/DL (ref 8.6–10.5)
CHLORIDE SERPL-SCNC: 105 MMOL/L (ref 98–107)
CLARITY UR: CLEAR
CO2 SERPL-SCNC: 26 MMOL/L (ref 22–29)
COLOR UR: YELLOW
CREAT SERPL-MCNC: 1.81 MG/DL (ref 0.76–1.27)
ERYTHROCYTE [DISTWIDTH] IN BLOOD BY AUTOMATED COUNT: 13.9 % (ref 12.3–15.4)
GFR SERPL CREATININE-BSD FRML MDRD: 36 ML/MIN/1.73
GLOBULIN UR ELPH-MCNC: 2.2 GM/DL
GLUCOSE SERPL-MCNC: 135 MG/DL (ref 65–99)
GLUCOSE UR STRIP-MCNC: NEGATIVE MG/DL
HCT VFR BLD AUTO: 43.1 % (ref 37.5–51)
HGB BLD-MCNC: 14.2 G/DL (ref 13–17.7)
HGB UR QL STRIP.AUTO: NEGATIVE
KETONES UR QL STRIP: NEGATIVE
LEUKOCYTE ESTERASE UR QL STRIP.AUTO: NEGATIVE
LYMPHOCYTES # BLD AUTO: 1.4 10*3/MM3 (ref 0.7–3.1)
LYMPHOCYTES NFR BLD AUTO: 21.8 % (ref 19.6–45.3)
MCH RBC QN AUTO: 30.5 PG (ref 26.6–33)
MCHC RBC AUTO-ENTMCNC: 33 G/DL (ref 31.5–35.7)
MCV RBC AUTO: 92.5 FL (ref 79–97)
MONOCYTES # BLD AUTO: 0.3 10*3/MM3 (ref 0.1–0.9)
MONOCYTES NFR BLD AUTO: 5.2 % (ref 5–12)
NEUTROPHILS NFR BLD AUTO: 4.8 10*3/MM3 (ref 1.7–7)
NEUTROPHILS NFR BLD AUTO: 73 % (ref 42.7–76)
NITRITE UR QL STRIP: NEGATIVE
PH UR STRIP.AUTO: 5 [PH] (ref 5–8)
PLATELET # BLD AUTO: 187 10*3/MM3 (ref 140–450)
PMV BLD AUTO: 5.8 FL (ref 6–12)
POTASSIUM SERPL-SCNC: 4.9 MMOL/L (ref 3.5–5.2)
PROT SERPL-MCNC: 6.1 G/DL (ref 6–8.5)
PROT UR QL STRIP: NEGATIVE
RBC # BLD AUTO: 4.66 10*6/MM3 (ref 4.14–5.8)
SODIUM SERPL-SCNC: 138 MMOL/L (ref 136–145)
SP GR UR STRIP: 1.03 (ref 1–1.03)
T4 FREE SERPL-MCNC: 1.07 NG/DL (ref 0.93–1.7)
TSH SERPL DL<=0.05 MIU/L-ACNC: 2 UIU/ML (ref 0.27–4.2)
UROBILINOGEN UR QL STRIP: NORMAL
WBC NRBC COR # BLD: 6.6 10*3/MM3 (ref 3.4–10.8)

## 2022-01-26 PROCEDURE — 36415 COLL VENOUS BLD VENIPUNCTURE: CPT

## 2022-01-26 PROCEDURE — 96413 CHEMO IV INFUSION 1 HR: CPT

## 2022-01-26 PROCEDURE — 85025 COMPLETE CBC W/AUTO DIFF WBC: CPT

## 2022-01-26 PROCEDURE — 25010000002 PEMBROLIZUMAB 100 MG/4ML SOLUTION 4 ML VIAL: Performed by: INTERNAL MEDICINE

## 2022-01-26 PROCEDURE — 84443 ASSAY THYROID STIM HORMONE: CPT

## 2022-01-26 PROCEDURE — 81003 URINALYSIS AUTO W/O SCOPE: CPT

## 2022-01-26 PROCEDURE — 80053 COMPREHEN METABOLIC PANEL: CPT

## 2022-01-26 PROCEDURE — 84439 ASSAY OF FREE THYROXINE: CPT

## 2022-01-26 PROCEDURE — 99214 OFFICE O/P EST MOD 30 MIN: CPT | Performed by: NURSE PRACTITIONER

## 2022-01-26 RX ORDER — SODIUM CHLORIDE 9 MG/ML
250 INJECTION, SOLUTION INTRAVENOUS ONCE
Status: DISCONTINUED | OUTPATIENT
Start: 2022-01-26 | End: 2022-01-27 | Stop reason: HOSPADM

## 2022-01-26 RX ORDER — SODIUM CHLORIDE 9 MG/ML
250 INJECTION, SOLUTION INTRAVENOUS ONCE
Status: CANCELLED | OUTPATIENT
Start: 2022-01-26

## 2022-01-26 RX ORDER — ONDANSETRON HYDROCHLORIDE 8 MG/1
8 TABLET, FILM COATED ORAL 3 TIMES DAILY PRN
Qty: 30 TABLET | Refills: 5 | Status: SHIPPED | OUTPATIENT
Start: 2022-01-26

## 2022-01-26 RX ADMIN — SODIUM CHLORIDE 200 MG: 9 INJECTION, SOLUTION INTRAVENOUS at 15:51

## 2022-01-26 NOTE — PROGRESS NOTES
CHEMOTHERAPY PREPARATION    South Coffey  [id number]  1940    Chief Complaint: Treatment preparation and needs assessment    History of present illness:  [place] Coffey is a 81 y.o. year old male who is here today for treatment preparation and needs assessment.  The patient has been diagnosed with metastatic renal cell carcinoma and is scheduled to begin treatment with Axitinib / Pembrolizumab.     Oncology History:    Oncology/Hematology History   Metastatic renal cell carcinoma (HCC)   12/26/2021 Initial Diagnosis    Metastatic renal cell carcinoma (HCC)     12/27/2021 Cancer Staged    Staging form: Kidney, AJCC 8th Edition  - Pathologic stage from 1/3/2022: Stage IV (pT3, pN0, cM1) - Signed by Otf Cooper MD on 1/6/2022 12/30/2021 Surgery    Surgery       Procedure:  Right debulking nephrectomy by Dr. Pedro Rubio     1/26/2022 -  Chemotherapy    OP KIDNEY Axitinib / Pembrolizumab 200 mg         The current medication list and allergy list were reviewed and reconciled.     Past Medical History, Past Surgical History, Social History, Family History have been reviewed and are without significant changes except as mentioned.    Review of Systems:    Review of Systems   Constitutional: Positive for fatigue. Negative for appetite change, fever and unexpected weight change.   HENT: Negative for mouth sores, sore throat and trouble swallowing.    Respiratory: Negative for cough, shortness of breath and wheezing.    Cardiovascular: Negative for chest pain, palpitations and leg swelling.   Gastrointestinal: Negative for abdominal distention, abdominal pain, constipation, diarrhea, nausea and vomiting.   Genitourinary: Negative for difficulty urinating, dysuria and frequency.   Musculoskeletal: Negative for arthralgias.   Skin: Negative for pallor, rash and wound.   Neurological: Negative for dizziness and weakness.   Hematological: Does not bruise/bleed easily.   Psychiatric/Behavioral: Negative  for confusion and sleep disturbance. The patient is not nervous/anxious.        Physical Exam:    Vitals:    01/26/22 1319   BP: 132/63   Pulse: 52   Resp: 16   Temp: 96.2 °F (35.7 °C)   SpO2: 96%     Vitals:    01/26/22 1319   PainSc: 0-No pain          ECOG: (1) Restricted in Physically Strenuous Activity, Ambulatory & Able to Do Work of [employer]    General: well appearing, in no acute distress  Cardiovascular: bradycardia, no murmurs noted  Lungs: clear to auscultation bilaterally, respirations even and unlabored  Extremities: no lower extremity edema  Skin: no rashes, lesions, bruising, or petechiae  Psych: Mood is stable            NEEDS ASSESSMENTS    Genetics  The patient's new diagnosis and family history have been reviewed for genetic counseling needs. A genetic referral is not recommended.     Psychosocial  The patient has completed a PHQ-9 Depression Screening and the Distress Thermometer (DT) today.   PHQ-9 results show 0 (No Depression). The patient scored their distress today as 6 on a scale of 0-10 with 0 being no distress and 10 being extreme distress.   Problems marked by the patient as being an issue for them within the last week include no problems.   Results were reviewed along with psychosocial resources offered by our cancer center.  Our oncology social worker will be flagged for a DT score of 4 or above, and a same day call will be made for a score of 9 or 10.  A mental health referral is offered at this time. The patient is not interested in a referral to [facility].   Copies of patient's questionnaires will be scanned into EMR for details and further reference.    Barriers to care  A barriers form was also completed by the patient today. We discussed services offered by our facility to help him have adequate access to care. The patient was given the name and contact information for our Oncology Social Worker, Radha Carias.  Based upon barriers assessment today, the patient will  "require a follow-up call from the  to further discuss needs.   A copy of the barriers form will also be scanned into EMR for details and further reference.     VAD Assessment  The patient and I discussed planned intervenous chemotherapy as well as other IV treatments that are often needed throughout the course of treatment. These may include, but are not limited to blood transfusions, antibiotics, and IV hydration. The vasculature does appear to be adequate for multiple peripheral IVs throughout their treatment course.     Advanced Care Planning  The patient and I discussed advanced care planning, \"Conversations that Matter\".   This service was offered, free of charge, for development of advance directives with a certified ACP facilitator.  The patient does not have an up-to-date advanced directive.  The patient is not interested in an appointment with one of our facilitators to create or update their advanced directives.      Palliative Care  The patient and I discussed palliative care services. Palliative care is not the same as Hospice care. This is specialized medical care for people living with serious illness with the goal of improving quality of life for the patient and their family. Kaylee has partnered with [facility] to offer our patients outpatient palliative care early along with their treatment to assist in coordination of care, symptom management, pain management, and medical decision making.  Oncology criteria for palliative care referral is not met at this time. The patient is not interested in a palliative care consultation.     Additional Referral needs  none      CHEMOTHERAPY EDUCATION    Chemotherapy education completed and consent obtained per oncology pharmacist.  See their documentation for further details.    Booklets Given: Chemotherapy and You [x]  Nutrition for the Patient with Cancer During Treatment [x]    Sexuality/Fertility Books []     Chemotherapy " Regimen:   Treatment Plans     Name Type Plan Dates Plan Provider         Active    OP KIDNEY Axitinib / Pembrolizumab 200 mg ONCOLOGY TREATMENT  1/25/2022 - Present Otf Cooper MD                    Chemotherapy education comprehension reviewed. Questions answered.      Assessment and Plan:    Diagnoses and all orders for this visit:    1. Renal cell carcinoma of right kidney metastatic to other site (HCC) (Primary)  -     Ambulatory Referral to ONC Social Work      The patient and I have reviewed their cancer diagnosis and scheduled treatment plan. Needs assessment was completed including genetics, psychosocial needs, barriers to care, VAD evaluation, advanced care planning, and palliative care services. Referrals have been ordered as appropriate based upon our evaluation and patient desires.     Chemotherapy teaching was also completed today per pharmacy.  Adequate time was given to answer questions.  Patient and family are aware of their care team members and contact information if they have questions or problems throughout the treatment course. The patient is adequately prepared to begin treatment as scheduled.  He is scheduled for Keytruda today and plans to get axitinib tomorrow.         Patient had pretreatment labs drawn today.  Patient reports feeling fatigue and generalized weakness at times.  I reviewed his blood pressure and pulse log for the past month.  Blood pressure consistently normal with a few low pressures noted.  His heart rate has been mostly 50-70's.  He does not have a PCP or cardiologist.  I have encouraged him to schedule an appointment for further evaluation.      I spent 30 minutes caring for South on this date of service. This time includes time spent by me in the following activities: preparing for the visit, reviewing tests, performing a medically appropriate examination and/or evaluation, counseling and educating the patient/family/caregiver, documenting information in the  medical record and care coordination.     Ct Bowen, APRN  01/26/22

## 2022-01-26 NOTE — PROGRESS NOTES
"Outpatient Oncology Nutrition     Reason for Visit:    Oncology Nutrition Screening and Patient Education / Met with patient during his initial immunotherapy infusion appointment.     Patient Name:  South Coffey    :  1940    MRN:  [id number]    Date of Encounter: 2022    Nutrition Assessment     Diagnosis: metastatic renal cell carcinoma    Surgery: right radical nephrectomy with adrenalectomy (21)    Treatment: Axitinib - po bid / Keytruda - IV every 21 days (D1C1)    Patient Active Problem List:    Patient Active Problem List   Diagnosis   • Metastatic renal cell carcinoma (HCC)   • CKD (chronic kidney disease) stage 3, GFR 30-59 ml/min (HCC)   • S/p right nephrectomy 21 by Dr. Rubio   • Solitary L kidney, acquired       Food / Nutrition Related History   Patient reports healing well since surgery, states his appetite has been normal, and denies significant nutritional complaints at this time.     Hydration Status   Discussed the importance of hydration and encouraged him to continue drinking water throughout the day.    Goal: 64-80 ounces     How many 8 ounces glasses of water do you consume per day?  Patient reports drinking mostly water.     Enteral Feeding       Anthropometric Measurements     Height:    Ht Readings from Last 1 Encounters:   22 167.6 cm (66\")       Weight:    Wt Readings from Last 1 Encounters:   22 77.7 kg (171 lb 6.4 oz)       BMI:  27.7 - Overweight     Weight Change: patient reports his weight has been stable     Review of Lab Data (Time Frame - 1 month / 2 month)   Labs reviewed - 22 - elevated BUN / Creatinine and decreased Albumin noted     Medication Review   MAR reviewed     Nutrition Focused Physical Findings       Nutrition Impact Symptoms   Constipation - managing well with docusate sodium     Physical Activity   Not my normal self, but able to be up and about with fairly normal activities    Current Nutritional Intake     Oral diet: " " Regular     Intake: He reports his oral intake has been been pretty normal     Malnutrition Risk Assessment     Recent weight loss over the past 6 months:  0 = No    Eating poorly because of a decreased appetite:  0 = No    Malnutrition Screening Score:     MST = 0 or 1 Patient not at risk for malnutrition    Nutrition Diagnosis     Problem    Etiology    Signs / Symptoms      Nutrition Intervention   Discussed the importance of good nutrition during his treatment course focusing on adequate calorie, protein, nutrient and fluid intake.  Advised him to be consuming smaller more frequent meals/snacks throughout the day to aid with potential nausea and diarrhea management.  Emphasized the importance of protein and its role in the diet; reviewed high protein foods; and recommended he have a protein source at each meal/snack.  Provided and reviewed written diet material \"Protein Content of Food\" and recommended he have a moderate amount of protein in his diet due to nephrectomy and increased creatinine.  Also provided and reviewed written diet material \"Tips for Control of Diarrhea\" to aid with potential nutritional impact symptom management.  Briefly discussed the importance of oral care and encouraged him to use the baking soda / salt water mouth rinse as needed.     Goal   To achieve adequate nutritional and hydration intake during treatment.  To aid with nutrition impact symptom management as needed.     Monitoring / Evaluation   Answered his questions and he voiced understanding of information discussed.  RD's contact information provided and encouraged to call with questions.  Will monitor as needed during his treatment course.    Edwige Curry MS, RD, LD   "

## 2022-01-26 NOTE — PROGRESS NOTES
Outpatient Infusion • [address] • [address] • [place] [address] • [phone]      Specialty Pharmacy - Oral Oncology     Subjective     [place] is a 81 y.o. male, who is followed by the specialty pharmacy service for axitinib (Inlyta), which is being used in combination with pembrolizumab (Keytruda) for metastatic renal cell carcinoma.    Cancer Staging  Metastatic renal cell carcinoma (HCC)  Staging form: Kidney, AJCC 8th Edition  - Pathologic stage from 1/3/2022: Stage IV (pT3, pN0, cM1) - Signed by Otf Cooper MD on 1/6/2022      Treatment Plan:  Treatment Plan Name:   OP KIDNEY Axitinib / Pembrolizumab 200 mg      Treatment Plan Provider:   Otf Cooper MD      Treatment Plan Goal:   Palliative      Treatment Plan Status:   Active      Treatment Plan Start Date:   1/26/2022 (Planned)      Treatment Plan Specialty Medication:   axitinib (INLYTA) 5 MG chemo tablet, 5 mg, Oral, Every 12 Hours, 0 of 1 cycle, Start date: 1/25/2022, End date: --        Treatment Weight:   Weight type: Documented (effective on 1/6/2022), 79.4 kg (entered on 12/30/2021), 167.6 cm (entered on 12/30/2021), BSA 1.89 m2      Last Treatment Date:   1/10/2024 (Planned)      Treatment Discontinue Date:   [Plan is still active]      Treatment Discontinue Reason:   [Plan is still active]          Objective     Oral Chemotherapy Teaching  · Oral Chemotherapy Regimen: Axitinib 5 mg by mouth every 12 hours  · Date Started Medication: 1/27/22 (with PM dose)  · Chemotherapy/Biotherapy Education Sheets Provided: Axitinib, pembrolizumab, HOPA immune checkpoint inhibitor packet, pembrolizumab wallet card, oral adherence, CINV, diarrhea, hand foot syndrome  · Expected Duration of Therapy: Until disease progression or intolerable toxicity.    Initial Teaching Comments   Safety   Storage instructions (away from children; away from heat/cold, sunlight, or moisture), handling - use of gloves (caregivers), washing  hands after touching pills, managing waste -Storage: Keep in the original bottle, at room temperature, out of direct sunlight, and out of reach of children and pets  -Discussed safe handling and what to do with any unused medication.   Adherence    patient and/or caregiver on how to take medication, take with/without food, assess their adherence potential, stress importance of adherence, ways to manage adherence (pill boxes, phone reminders, calendars), what to do if miss a dose -Dose/Frequency: 1 tablet (5 mg) every 12 hours  -Administration: Take by mouth with or without food, with a full glass of water, but at the same time each day  -Patient is likely to have good treatment adherence;  reinforced the importance of adherence.   -Reviewed how to address missed doses and to let his physician know of any missed doses.   Side Effects/Adverse Reactions    patient on potential side effects, s/s, ways to manage, when to call MD/seek help See full details below.   Miscellaneous   Food interactions, DDIs, financial issues -Reminded the patient to call pharmacy before making any medication changes or starting any new prescription or OTC medications so we can first assess drug interactions.  -Drug-food interactions: Counseled patient to avoid grapefruit and grapefruit juice while taking this medication.  -Financial issues: None  -Drug-drug interactions: None       TOPICS EDUCATION PROVIDED COMMENTS   ANEMIA:  role of RBC, cause, s/s, ways to manage, role of transfusion [x] Reviewed the role of RBC and the use of transfusions if hemoglobin decreases too much.  Patient to notify us if they experience shortness of breath, dizziness, or palpitations.  Also let patient know they could feel more tired than usual and to try to stay active, but rest if they need to.   THROMBOCYTOPENIA:  role of platelet, cause, s/s, ways to prevent bleeding, things to avoid, when to seek help [x] Reviewed the role of platelets in  blood clotting and when to call clinic (bloody nose that bleeds for 5 mins despite pressure, a cut that won't stop bleeding despite pressure, gums that bleed excessively with brushing or flossing).   NEUTROPENIA:  role of WBC, cause, infection precautions, s/s of infection, when to call MD [x] Reviewed the role of WBC, good infection prevention practices, and when to call the clinic (temperature 100.4F, sore throat, burning urination, etc)  COVID Vaccines: 2 doses plus booster  Flu Vaccine: Vaccine received   NUTRITION & APPETITE CHANGES:  importance of maintaining healthy diet & weight, ways to manage to improve intake, dietary consult, exercise regimen [x] Discussed risk of decreased appetite, reviewed plan for small more frequent meals.   DIARRHEA:  causes, s/s of dehydration, ways to manage, dietary changes, when to call MD [x] Discussed risk of diarrhea and immune related colitis. Can use OTC loperamide at first presentation of diarrhea, but call MD if 4-6 episodes in 24 hours not relieved by OTC loperamide. Discussed role of high dose steroids for the treatment of immune related colitis.    NAUSEA & VOMITING:  cause, use of antiemetics, dietary changes, when to call MD [x] At home meds: Ondansetron    Instructed the patient to take a dose of the PRN medication at the first onset of nausea and if it's not working to call us for additional medications.  Also provided non-drug measures to mitigate nausea.   PAIN:  causes, ways to manage [x] Discussed muscle and joint aches/pains with chemotherapy, and recommended the use of OTC pain relief with ibuprofen or acetaminophen if needed.   SKIN & NAIL CHANGES:  cause, s/s, ways to manage [x] Discussed potential for a rash or itchy skin from immunotherapy, offered nonpharmacologic prevention strategies, and instructed patient to call if a rash develops that worsens or gets larger.    Hand-foot syndrome was discussed, including the s/s, prevention measures, and treatment  measures. A supplementary handout with this information was also given to the patient.    ORGAN TOXICITIES:  cause, s/s, need for diagnostic tests, labs, when to notify MD [x] Discussed potential effects on organ systems, monitoring, diagnostic tests, labs, and when to notify their MD. Discussed the signs/symptoms of the following: hepatotoxicity, nephrotoxicity, cardiotoxicity, hormone gland changes, lung changes, hypertension, delayed wound healing and GI perforation.    Recommended that the patient closely monitor his blood pressure. Provided him with a patient starter kit from the  which has a BP log.   HOME CARE:  use of spill kits, storing of PO chemo, how to manage bodily fluids [x] IV/PO - Counseled on management of soiled linens and proper flush technique. Discussed proper handling, storage, and disposal of PO chemo. Discussed how to manage all the side effects at home and advised when to contact the MD office   MISCELLANEOUS:  drug interactions, administration, labs, etc. [x] Discussed chemotherapy schedule, lab draws, infusion times, and total expected visit time.     Discussed voice changes or hoarseness that can occur with axitinib, which are not permanent and should go away when he is not taking the medication.    Reviewed the rare, but serious AEs of axitinib, including: risk of stroke and heart attack, decreased wound healing, thyroid damage, intestinal perforation, PRES, risk of bleeding or blood clots.       Notes: All questions and concerns were addressed. Provided a personalized treatment calendar to patient (includes treatment and lab schedule). Provided patient with contact information for the pharmacist and clinic while instructing them to call if any questions or concerns arise. Informed consent for treatment was obtained. Patient seemed very hesitant about starting treatment, but was assured that it was his decision and that he can stop at any time.      Yonis Harris, Pharmacy  Intern    1/26/2022 13:16 EST       Note reviewed and edited by: Hermila Bowen, PharmD, BCOP - Oncology Clinical Pharmacist [phone]

## 2022-02-04 ENCOUNTER — TELEPHONE (OUTPATIENT)
Dept: ONCOLOGY | Facility: CLINIC | Age: 82
End: 2022-02-04

## 2022-02-04 NOTE — TELEPHONE ENCOUNTER
Caller: NICOLE SHARIF    Relationship: Emergency Contact    Best call back number: [phone]    What is the best time to reach you: ASAP    Who are you requesting to speak with (clinical staff, provider,  specific staff member): TEMO COLE    Do you know the name of the person who called:     What was the call regarding: ASSITANCE    Do you require a callback: YES

## 2022-02-08 ENCOUNTER — TELEPHONE (OUTPATIENT)
Dept: SOCIAL WORK | Facility: HOSPITAL | Age: 82
End: 2022-02-08

## 2022-02-15 DIAGNOSIS — C64.1 RENAL CELL CARCINOMA OF RIGHT KIDNEY METASTATIC TO OTHER SITE: Primary | ICD-10-CM

## 2022-02-16 ENCOUNTER — OFFICE VISIT (OUTPATIENT)
Dept: ONCOLOGY | Facility: CLINIC | Age: 82
End: 2022-02-16

## 2022-02-16 ENCOUNTER — HOSPITAL ENCOUNTER (OUTPATIENT)
Dept: ONCOLOGY | Facility: HOSPITAL | Age: 82
Setting detail: INFUSION SERIES
Discharge: HOME | End: 2022-02-16

## 2022-02-16 ENCOUNTER — SPECIALTY PHARMACY (OUTPATIENT)
Dept: ONCOLOGY | Facility: HOSPITAL | Age: 82
End: 2022-02-16

## 2022-02-16 VITALS
HEART RATE: 57 BPM | DIASTOLIC BLOOD PRESSURE: 65 MMHG | WEIGHT: 163.8 LBS | HEIGHT: 66 IN | BODY MASS INDEX: 26.33 KG/M2 | TEMPERATURE: 96.9 F | RESPIRATION RATE: 16 BRPM | SYSTOLIC BLOOD PRESSURE: 154 MMHG | OXYGEN SATURATION: 96 %

## 2022-02-16 DIAGNOSIS — C64.1 RENAL CELL CARCINOMA OF RIGHT KIDNEY METASTATIC TO OTHER SITE: Primary | ICD-10-CM

## 2022-02-16 DIAGNOSIS — C64.1 METASTATIC RENAL CELL CARCINOMA, RIGHT: ICD-10-CM

## 2022-02-16 DIAGNOSIS — C64.1 METASTATIC RENAL CELL CARCINOMA, RIGHT: Primary | ICD-10-CM

## 2022-02-16 LAB
ALBUMIN SERPL-MCNC: 4.1 G/DL (ref 3.5–5.2)
ALBUMIN/GLOB SERPL: 1.5 G/DL
ALP SERPL-CCNC: 111 U/L (ref 39–117)
ALT SERPL W P-5'-P-CCNC: 21 U/L (ref 1–41)
AMYLASE SERPL-CCNC: 78 U/L (ref 28–100)
ANION GAP SERPL CALCULATED.3IONS-SCNC: 10 MMOL/L (ref 5–15)
AST SERPL-CCNC: 26 U/L (ref 1–40)
BILIRUB SERPL-MCNC: 1 MG/DL (ref 0–1.2)
BUN SERPL-MCNC: 34 MG/DL (ref 8–23)
BUN/CREAT SERPL: 14.7 (ref 7–25)
CALCIUM SPEC-SCNC: 9.9 MG/DL (ref 8.6–10.5)
CHLORIDE SERPL-SCNC: 103 MMOL/L (ref 98–107)
CO2 SERPL-SCNC: 22 MMOL/L (ref 22–29)
CREAT SERPL-MCNC: 2.32 MG/DL (ref 0.76–1.27)
ERYTHROCYTE [DISTWIDTH] IN BLOOD BY AUTOMATED COUNT: 15.3 % (ref 12.3–15.4)
GFR SERPL CREATININE-BSD FRML MDRD: 27 ML/MIN/1.73
GLOBULIN UR ELPH-MCNC: 2.7 GM/DL
GLUCOSE SERPL-MCNC: 122 MG/DL (ref 65–99)
HCT VFR BLD AUTO: 47.3 % (ref 37.5–51)
HGB BLD-MCNC: 16 G/DL (ref 13–17.7)
LIPASE SERPL-CCNC: 75 U/L (ref 13–60)
LYMPHOCYTES # BLD AUTO: 1.3 10*3/MM3 (ref 0.7–3.1)
LYMPHOCYTES NFR BLD AUTO: 19.9 % (ref 19.6–45.3)
MCH RBC QN AUTO: 30.8 PG (ref 26.6–33)
MCHC RBC AUTO-ENTMCNC: 33.9 G/DL (ref 31.5–35.7)
MCV RBC AUTO: 91 FL (ref 79–97)
MONOCYTES # BLD AUTO: 0.3 10*3/MM3 (ref 0.1–0.9)
MONOCYTES NFR BLD AUTO: 5.1 % (ref 5–12)
NEUTROPHILS NFR BLD AUTO: 4.9 10*3/MM3 (ref 1.7–7)
NEUTROPHILS NFR BLD AUTO: 75 % (ref 42.7–76)
PLATELET # BLD AUTO: 141 10*3/MM3 (ref 140–450)
PMV BLD AUTO: 6.4 FL (ref 6–12)
POTASSIUM SERPL-SCNC: 5.3 MMOL/L (ref 3.5–5.2)
PROT SERPL-MCNC: 6.8 G/DL (ref 6–8.5)
RBC # BLD AUTO: 5.2 10*6/MM3 (ref 4.14–5.8)
SODIUM SERPL-SCNC: 135 MMOL/L (ref 136–145)
WBC NRBC COR # BLD: 6.5 10*3/MM3 (ref 3.4–10.8)

## 2022-02-16 PROCEDURE — 99214 OFFICE O/P EST MOD 30 MIN: CPT | Performed by: INTERNAL MEDICINE

## 2022-02-16 PROCEDURE — 25010000002 PEMBROLIZUMAB 100 MG/4ML SOLUTION 4 ML VIAL: Performed by: INTERNAL MEDICINE

## 2022-02-16 PROCEDURE — 85025 COMPLETE CBC W/AUTO DIFF WBC: CPT | Performed by: INTERNAL MEDICINE

## 2022-02-16 PROCEDURE — 82150 ASSAY OF AMYLASE: CPT | Performed by: INTERNAL MEDICINE

## 2022-02-16 PROCEDURE — 83690 ASSAY OF LIPASE: CPT | Performed by: INTERNAL MEDICINE

## 2022-02-16 PROCEDURE — 80053 COMPREHEN METABOLIC PANEL: CPT | Performed by: INTERNAL MEDICINE

## 2022-02-16 PROCEDURE — 96361 HYDRATE IV INFUSION ADD-ON: CPT

## 2022-02-16 PROCEDURE — 96413 CHEMO IV INFUSION 1 HR: CPT

## 2022-02-16 PROCEDURE — 96360 HYDRATION IV INFUSION INIT: CPT

## 2022-02-16 RX ORDER — SODIUM CHLORIDE 9 MG/ML
250 INJECTION, SOLUTION INTRAVENOUS ONCE
Status: CANCELLED | OUTPATIENT
Start: 2022-03-09

## 2022-02-16 RX ORDER — SODIUM CHLORIDE 9 MG/ML
500 INJECTION, SOLUTION INTRAVENOUS ONCE
Status: COMPLETED | OUTPATIENT
Start: 2022-02-16 | End: 2022-02-16

## 2022-02-16 RX ORDER — SODIUM CHLORIDE 9 MG/ML
250 INJECTION, SOLUTION INTRAVENOUS ONCE
Status: COMPLETED | OUTPATIENT
Start: 2022-02-16 | End: 2022-02-16

## 2022-02-16 RX ORDER — SODIUM CHLORIDE 9 MG/ML
500 INJECTION, SOLUTION INTRAVENOUS ONCE
Status: CANCELLED
Start: 2022-02-16

## 2022-02-16 RX ORDER — SODIUM CHLORIDE 9 MG/ML
250 INJECTION, SOLUTION INTRAVENOUS ONCE
Status: CANCELLED | OUTPATIENT
Start: 2022-02-16

## 2022-02-16 RX ADMIN — SODIUM CHLORIDE 250 ML: 9 INJECTION, SOLUTION INTRAVENOUS at 14:05

## 2022-02-16 RX ADMIN — SODIUM CHLORIDE 500 ML: 9 INJECTION, SOLUTION INTRAVENOUS at 14:02

## 2022-02-16 RX ADMIN — SODIUM CHLORIDE 200 MG: 9 INJECTION, SOLUTION INTRAVENOUS at 14:07

## 2022-02-16 NOTE — PROGRESS NOTES
PROBLEM LIST:  Oncology/Hematology History   Metastatic renal cell carcinoma, right (HCC)   12/26/2021 Initial Diagnosis    Metastatic renal cell carcinoma (HCC)     12/27/2021 Cancer Staged    Staging form: Kidney, AJCC 8th Edition  - Pathologic stage from 1/3/2022: Stage IV (pT3, pN0, cM1) - Signed by Otf Cooper MD on 1/6/2022 12/30/2021 Surgery    Surgery       Procedure:  Right debulking nephrectomy by Dr. Pedro Rubio     1/26/2022 -  Chemotherapy    OP KIDNEY Axitinib / Pembrolizumab 200 mg         REASON FOR VISIT: Management of my kidney cancer    HISTORY OF PRESENT ILLNESS:   81 y.o.  male presents today for follow-up after undergoing a right nephrectomy by Dr. Pedro Rubio.  He is currently on Inlyta and Keytruda.  Seems to be tolerating it reasonably well.  Has some fatigue issues.  Does have some issues with memory.  Though I felt that he had some of this prior to his treatment.    Past medical history, social history and family history was reviewed 02/16/22 and unchanged from prior visit.    Review of Systems:    Review of Systems - Oncology         Medications:        Current Outpatient Medications:   •  acetaminophen (TYLENOL) 325 MG tablet, Take 2 tablets by mouth Every 4 (Four) Hours As Needed for Mild Pain  or Moderate Pain ., Disp: , Rfl:   •  axitinib (INLYTA) 5 MG chemo tablet, Take 1 tablet by mouth Every 12 (Twelve) Hours., Disp: 60 tablet, Rfl: 11  •  docusate sodium 100 MG capsule, Take 2 capsules by mouth Daily As Needed for Constipation., Disp: 30 each, Rfl: 3  •  ondansetron (ZOFRAN) 8 MG tablet, Take 1 tablet by mouth 3 (Three) Times a Day As Needed for Nausea or Vomiting., Disp: 30 tablet, Rfl: 5    Pain Medications             acetaminophen (TYLENOL) 325 MG tablet Take 2 tablets by mouth Every 4 (Four) Hours As Needed for Mild Pain  or Moderate Pain .             ALLERGIES:  No Known Allergies      Physical Exam    VITAL SIGNS:  /65   Pulse 57   Temp 96.9  "°F (36.1 °C) (Temporal)   Resp 16   Ht 167.6 cm (66\")   Wt 74.3 kg (163 lb 12.8 oz)   SpO2 96%   BMI 26.44 kg/m²                 Wt Readings from Last 3 Encounters:   02/16/22 74.3 kg (163 lb 12.8 oz)   01/26/22 77.7 kg (171 lb 6.4 oz)   01/19/22 77.9 kg (171 lb 11.2 oz)       Body mass index is 26.44 kg/m². Body surface area is 1.84 meters squared.    Pain Score    02/16/22 1300   PainSc: 0-No pain           Performance Status: 0    General: well appearing, in no acute distress  HEENT: sclera anicteric, neck is supple  Lymphatics: no cervical, supraclavicular, or axillary adenopathy  Cardiovascular: regular rate and rhythm, no murmurs, rubs or gallops  Lungs: clear to auscultation bilaterally  Abdomen: soft, nontender, nondistended.  No palpable organomegaly  Extremities: no lower extremity edema  Skin: no rashes, lesions, bruising, or petechiae  Msk:  Shows no weakness of the large muscle groups  Psych: Mood is stable        RECENT LABS:    Lab Results   Component Value Date    HGB 16.0 02/16/2022    HCT 47.3 02/16/2022    MCV 91.0 02/16/2022     02/16/2022    WBC 6.50 02/16/2022    NEUTROABS 4.90 02/16/2022    LYMPHSABS 1.30 02/16/2022    MONOSABS 0.30 02/16/2022    EOSABS 0.15 12/26/2021    BASOSABS 0.03 12/26/2021       Lab Results   Component Value Date    GLUCOSE 122 (H) 02/16/2022    BUN 34 (H) 02/16/2022    CREATININE 2.32 (H) 02/16/2022     (L) 02/16/2022    K 5.3 (H) 02/16/2022     02/16/2022    CO2 22.0 02/16/2022    CALCIUM 9.9 02/16/2022    PROTEINTOT 6.8 02/16/2022    ALBUMIN 4.10 02/16/2022    BILITOT 1.0 02/16/2022    ALKPHOS 111 02/16/2022    AST 26 02/16/2022    ALT 21 02/16/2022     Lab Results   Component Value Date    FINALDX  12/30/2021     KIDNEY AND ADRENAL GLAND, RIGHT, RADICAL NEPHRECTOMY AND ADRENALECTOMY:  Clear-cell renal cell carcinoma, histologic grade G2  Tumor extends into perinephric tissue and renal sinus (pT3a)  Adrenal gland with no significant pathologic " abnormality  Surgical margins negative for tumor  See CAP template below      KIDNEY  TYPE OF SPECIMEN/PROCEDURE:  Radical nephrectomy and adrenelectomy  SPECIMEN LATERALITY: Right  TUMOR SIZE: (MACROSCOPIC/MICROSCOPIC EXTENT OF TUMOR): 10.0 x 9.0 x 7.0 cm  TUMOR SITE: Upper pole  FOCALITY (UNIFOCAL, MULTIFOCAL):  Unifocal  HISTOLOGIC TYPE:  Clear cell renal cell carcinoma  SARCOMATOID FEATURES: Not identified   RHABDOID FEATURES: Not identified  HISTOLOGIC GRADE (IUSP):  G2  TUMOR NECROSIS (SPECIFY PERCENTAGE): Present; 25%  TUMOR EXTENSION: Extends into perinephric tissue and into renal sinus  MICROSCOPIC VASCULAR/LYMPHVASCULAR INVASION:  Not identified  SURGICAL AND VASCULAR MARGIN INVOLVEMENT:  All margins negative for invasive carcinoma  REGIONAL LYMPH NODE STATUS:  No lymph nodes found or sumbitted  AJCC PATHOLOGIC STAGE:  (COMPLETED BY PATHOLOGIST, BASED ONLY ON TISSUE FINDINGS, MORE EXTENSIVE DISEASE MAY NOT BE KNOWN TO THE PATHOLOGIST)  pT=  pT3a  pN=  pNX           CT Abdomen Pelvis Without Contrast    Result Date: 12/26/2021  1. Interval enlargement of a malignant right upper pole renal mass with interval development of a new right lower pole renal mass. Findings are compatible with renal cell carcinoma. 2. There is metastatic disease in both lung bases. 3. High density material in the right renal pelvis probably reflects some clot although tumor is not excluded. The left kidney is normal. 4. Retroperitoneal adenopathy may reflect metastatic disease. 5. No acute findings in the GI tract. Normal appendix. 6. Additional findings as above. Signer Name: Naveed Ventura MD  Signed: 12/26/2021 12:47 AM  Workstation Name: [facility]  [facility]    CT Chest Without Contrast Diagnostic    Result Date: 12/27/2021  1. Scattered small round pulmonary parenchymal nodules, mostly of the lower lungs, suspicious for hematogenous spread of metastatic disease.  2. No evidence of active chest  disease elsewhere.  DICTATED:   12/26/2021 EDITED/lfs:   12/26/2021   This report was finalized on 12/27/2021 12:59 PM by Dr. Dwight Galindo MD.      MRI Brain Without Contrast    Result Date: 12/27/2021  No evidence of intracranial metastatic disease or other acute intracranial disease.  DICTATED:   12/26/2021 EDITED/lfs:   12/26/2021   This report was finalized on 12/27/2021 12:13 AM by Dr. Dwight Galindo MD.      NM Renal With Flow & Function Without Pharmacological Intervention    Result Date: 1/1/2022  Essentially nonfunctioning right kidney, largely replaced by tumor. No visible excretion of contrast. Normal concentration and excretion of contrast by the left kidney.  D:  12/30/2021 E:  12/30/2021  This report was finalized on 1/1/2022 6:42 PM by Dr. Dwight Galindo MD.      US Renal Limited    Result Date: 12/28/2021  Right superior pole 8 cm renal mass with lobulated margins better seen on recent CT. Nonobstructing right nephrolithiasis.   D:  12/28/2021 E:  12/28/2021  This report was finalized on 12/28/2021 12:24 PM by Dr. Jama Vanegas.            Assessment/Plan    1.  Stage IV renal cell carcinoma with lung metastases.  Continue with Inlyta and Keytruda.  Plan to rescan him in 6 weeks to make sure he is doing well.  I want to see if he is having a disease letter response.  He is having the usual side effects I see with Inlyta which is fatigue.  But otherwise seems to be doing well.  Continue with no dose changes today.      Total time of patient care on day of service including time prior to, face to face with patient, and following visit spent in reviewing records, lab results, imaging studies, discussion with patient, and documentation/charting was > 32 minutes.     Otf Cooper MD  [facility] Hematology and Oncology         No orders of the defined types were placed in this encounter.      2/16/2022

## 2022-03-09 ENCOUNTER — APPOINTMENT (OUTPATIENT)
Dept: ONCOLOGY | Facility: HOSPITAL | Age: 82
End: 2022-03-09

## 2022-03-09 ENCOUNTER — HOSPITAL ENCOUNTER (OUTPATIENT)
Dept: ONCOLOGY | Facility: HOSPITAL | Age: 82
Setting detail: INFUSION SERIES
Discharge: HOME | End: 2022-03-09

## 2022-03-09 ENCOUNTER — TELEPHONE (OUTPATIENT)
Dept: ONCOLOGY | Facility: CLINIC | Age: 82
End: 2022-03-09

## 2022-03-09 VITALS
HEART RATE: 65 BPM | DIASTOLIC BLOOD PRESSURE: 55 MMHG | SYSTOLIC BLOOD PRESSURE: 107 MMHG | WEIGHT: 159.25 LBS | TEMPERATURE: 96.6 F | BODY MASS INDEX: 25.7 KG/M2 | RESPIRATION RATE: 16 BRPM

## 2022-03-09 DIAGNOSIS — C64.1 METASTATIC RENAL CELL CARCINOMA, RIGHT: Primary | ICD-10-CM

## 2022-03-09 LAB
ALBUMIN SERPL-MCNC: 4.2 G/DL (ref 3.5–5.2)
ALBUMIN/GLOB SERPL: 1.7 G/DL
ALP SERPL-CCNC: 109 U/L (ref 39–117)
ALT SERPL W P-5'-P-CCNC: 62 U/L (ref 1–41)
ANION GAP SERPL CALCULATED.3IONS-SCNC: 9 MMOL/L (ref 5–15)
AST SERPL-CCNC: 44 U/L (ref 1–40)
BILIRUB SERPL-MCNC: 1.1 MG/DL (ref 0–1.2)
BUN SERPL-MCNC: 31 MG/DL (ref 8–23)
BUN/CREAT SERPL: 15.7 (ref 7–25)
CALCIUM SPEC-SCNC: 9.6 MG/DL (ref 8.6–10.5)
CHLORIDE SERPL-SCNC: 102 MMOL/L (ref 98–107)
CO2 SERPL-SCNC: 24 MMOL/L (ref 22–29)
CREAT SERPL-MCNC: 1.98 MG/DL (ref 0.76–1.27)
EGFRCR SERPLBLD CKD-EPI 2021: 33.5 ML/MIN/1.73
ERYTHROCYTE [DISTWIDTH] IN BLOOD BY AUTOMATED COUNT: 15.7 % (ref 12.3–15.4)
GLOBULIN UR ELPH-MCNC: 2.5 GM/DL
GLUCOSE SERPL-MCNC: 113 MG/DL (ref 65–99)
HCT VFR BLD AUTO: 52.6 % (ref 37.5–51)
HGB BLD-MCNC: 17.6 G/DL (ref 13–17.7)
LYMPHOCYTES # BLD AUTO: 1.7 10*3/MM3 (ref 0.7–3.1)
LYMPHOCYTES NFR BLD AUTO: 19 % (ref 19.6–45.3)
MCH RBC QN AUTO: 30.5 PG (ref 26.6–33)
MCHC RBC AUTO-ENTMCNC: 33.6 G/DL (ref 31.5–35.7)
MCV RBC AUTO: 90.9 FL (ref 79–97)
MONOCYTES # BLD AUTO: 0.3 10*3/MM3 (ref 0.1–0.9)
MONOCYTES NFR BLD AUTO: 3.3 % (ref 5–12)
NEUTROPHILS NFR BLD AUTO: 6.8 10*3/MM3 (ref 1.7–7)
NEUTROPHILS NFR BLD AUTO: 77.7 % (ref 42.7–76)
PLATELET # BLD AUTO: 147 10*3/MM3 (ref 140–450)
PMV BLD AUTO: 6.8 FL (ref 6–12)
POTASSIUM SERPL-SCNC: 6.1 MMOL/L (ref 3.5–5.2)
PROT SERPL-MCNC: 6.7 G/DL (ref 6–8.5)
RBC # BLD AUTO: 5.78 10*6/MM3 (ref 4.14–5.8)
SODIUM SERPL-SCNC: 135 MMOL/L (ref 136–145)
T4 FREE SERPL-MCNC: 0.95 NG/DL (ref 0.93–1.7)
TSH SERPL DL<=0.05 MIU/L-ACNC: 6.84 UIU/ML (ref 0.27–4.2)
WBC NRBC COR # BLD: 8.8 10*3/MM3 (ref 3.4–10.8)

## 2022-03-09 PROCEDURE — 84439 ASSAY OF FREE THYROXINE: CPT | Performed by: INTERNAL MEDICINE

## 2022-03-09 PROCEDURE — 80053 COMPREHEN METABOLIC PANEL: CPT | Performed by: INTERNAL MEDICINE

## 2022-03-09 PROCEDURE — 96361 HYDRATE IV INFUSION ADD-ON: CPT

## 2022-03-09 PROCEDURE — 25010000002 PEMBROLIZUMAB 100 MG/4ML SOLUTION 4 ML VIAL: Performed by: INTERNAL MEDICINE

## 2022-03-09 PROCEDURE — 84443 ASSAY THYROID STIM HORMONE: CPT | Performed by: INTERNAL MEDICINE

## 2022-03-09 PROCEDURE — 96413 CHEMO IV INFUSION 1 HR: CPT

## 2022-03-09 PROCEDURE — 85025 COMPLETE CBC W/AUTO DIFF WBC: CPT | Performed by: INTERNAL MEDICINE

## 2022-03-09 RX ORDER — SODIUM CHLORIDE 9 MG/ML
500 INJECTION, SOLUTION INTRAVENOUS ONCE
Status: COMPLETED | OUTPATIENT
Start: 2022-03-09 | End: 2022-03-09

## 2022-03-09 RX ORDER — SODIUM CHLORIDE 9 MG/ML
500 INJECTION, SOLUTION INTRAVENOUS ONCE
Status: CANCELLED
Start: 2022-03-09

## 2022-03-09 RX ORDER — SODIUM CHLORIDE 9 MG/ML
250 INJECTION, SOLUTION INTRAVENOUS ONCE
Status: DISCONTINUED | OUTPATIENT
Start: 2022-03-09 | End: 2022-03-10 | Stop reason: HOSPADM

## 2022-03-09 RX ADMIN — SODIUM CHLORIDE 200 MG: 9 INJECTION, SOLUTION INTRAVENOUS at 14:41

## 2022-03-09 RX ADMIN — SODIUM CHLORIDE 500 ML: 9 INJECTION, SOLUTION INTRAVENOUS at 14:26

## 2022-03-09 NOTE — TELEPHONE ENCOUNTER
Critical Test Results      MD: Mio    Date: 3/9/22     Critical test result:  K = 6.1, TSH 6.8 and Cr 1.9    Time results received:  1:40

## 2022-03-09 NOTE — TELEPHONE ENCOUNTER
Dr. Lieberman notified normal saline 500 ml ordered and infused with treatment and ok to treat entered for patient to be treated per Dr. Lieberman.

## 2022-03-17 ENCOUNTER — ANESTHESIA EVENT (OUTPATIENT)
Dept: PERIOP | Facility: HOSPITAL | Age: 82
End: 2022-03-17

## 2022-03-17 ENCOUNTER — HOSPITAL ENCOUNTER (INPATIENT)
Facility: HOSPITAL | Age: 82
LOS: 8 days | Discharge: NURSING, REHAB OR LONG-TERM CARE | End: 2022-03-25
Attending: EMERGENCY MEDICINE | Admitting: SURGERY

## 2022-03-17 ENCOUNTER — ANESTHESIA (OUTPATIENT)
Dept: PERIOP | Facility: HOSPITAL | Age: 82
End: 2022-03-17

## 2022-03-17 ENCOUNTER — APPOINTMENT (OUTPATIENT)
Dept: CT IMAGING | Facility: HOSPITAL | Age: 82
End: 2022-03-17

## 2022-03-17 ENCOUNTER — ANESTHESIA EVENT CONVERTED (OUTPATIENT)
Dept: ANESTHESIOLOGY | Facility: HOSPITAL | Age: 82
End: 2022-03-17

## 2022-03-17 DIAGNOSIS — K40.90 LEFT INGUINAL HERNIA: ICD-10-CM

## 2022-03-17 DIAGNOSIS — K46.0 INCARCERATED HERNIA: Primary | ICD-10-CM

## 2022-03-17 LAB
ABO GROUP BLD: NORMAL
ALBUMIN SERPL-MCNC: 3.6 G/DL (ref 3.5–5.2)
ALBUMIN/GLOB SERPL: 1.3 G/DL
ALP SERPL-CCNC: 104 U/L (ref 39–117)
ALT SERPL W P-5'-P-CCNC: 36 U/L (ref 1–41)
ANION GAP SERPL CALCULATED.3IONS-SCNC: 10 MMOL/L (ref 5–15)
APTT PPP: 40.4 SECONDS (ref 22–39)
AST SERPL-CCNC: 31 U/L (ref 1–40)
BACTERIA UR QL AUTO: NORMAL /HPF
BASOPHILS # BLD AUTO: 0.03 10*3/MM3 (ref 0–0.2)
BASOPHILS NFR BLD AUTO: 0.3 % (ref 0–1.5)
BILIRUB SERPL-MCNC: 1 MG/DL (ref 0–1.2)
BILIRUB UR QL STRIP: NEGATIVE
BLD GP AB SCN SERPL QL: NEGATIVE
BUN SERPL-MCNC: 31 MG/DL (ref 8–23)
BUN/CREAT SERPL: 15.7 (ref 7–25)
CALCIUM SPEC-SCNC: 9.6 MG/DL (ref 8.6–10.5)
CHLORIDE SERPL-SCNC: 102 MMOL/L (ref 98–107)
CLARITY UR: CLEAR
CO2 SERPL-SCNC: 23 MMOL/L (ref 22–29)
COLOR UR: YELLOW
CREAT SERPL-MCNC: 1.98 MG/DL (ref 0.76–1.27)
D-LACTATE SERPL-SCNC: 1.2 MMOL/L (ref 0.5–2)
DEPRECATED RDW RBC AUTO: 46.2 FL (ref 37–54)
EGFRCR SERPLBLD CKD-EPI 2021: 33.5 ML/MIN/1.73
EOSINOPHIL # BLD AUTO: 0.15 10*3/MM3 (ref 0–0.4)
EOSINOPHIL NFR BLD AUTO: 1.7 % (ref 0.3–6.2)
ERYTHROCYTE [DISTWIDTH] IN BLOOD BY AUTOMATED COUNT: 14.6 % (ref 12.3–15.4)
GLOBULIN UR ELPH-MCNC: 2.7 GM/DL
GLUCOSE SERPL-MCNC: 101 MG/DL (ref 65–99)
GLUCOSE UR STRIP-MCNC: NEGATIVE MG/DL
HCT VFR BLD AUTO: 46.9 % (ref 37.5–51)
HGB BLD-MCNC: 16.9 G/DL (ref 13–17.7)
HGB UR QL STRIP.AUTO: NEGATIVE
HYALINE CASTS UR QL AUTO: NORMAL /LPF
IMM GRANULOCYTES # BLD AUTO: 0.05 10*3/MM3 (ref 0–0.05)
IMM GRANULOCYTES NFR BLD AUTO: 0.6 % (ref 0–0.5)
INR PPP: 1.18 (ref 0.85–1.16)
KETONES UR QL STRIP: ABNORMAL
LEUKOCYTE ESTERASE UR QL STRIP.AUTO: ABNORMAL
LYMPHOCYTES # BLD AUTO: 1.39 10*3/MM3 (ref 0.7–3.1)
LYMPHOCYTES NFR BLD AUTO: 15.9 % (ref 19.6–45.3)
MCH RBC QN AUTO: 31.4 PG (ref 26.6–33)
MCHC RBC AUTO-ENTMCNC: 36 G/DL (ref 31.5–35.7)
MCV RBC AUTO: 87 FL (ref 79–97)
MONOCYTES # BLD AUTO: 0.7 10*3/MM3 (ref 0.1–0.9)
MONOCYTES NFR BLD AUTO: 8 % (ref 5–12)
NEUTROPHILS NFR BLD AUTO: 6.41 10*3/MM3 (ref 1.7–7)
NEUTROPHILS NFR BLD AUTO: 73.5 % (ref 42.7–76)
NITRITE UR QL STRIP: NEGATIVE
NRBC BLD AUTO-RTO: 0 /100 WBC (ref 0–0.2)
PH UR STRIP.AUTO: <=5 [PH] (ref 5–8)
PLATELET # BLD AUTO: 131 10*3/MM3 (ref 140–450)
PMV BLD AUTO: 8.8 FL (ref 6–12)
POTASSIUM SERPL-SCNC: 5.1 MMOL/L (ref 3.5–5.2)
PROT SERPL-MCNC: 6.3 G/DL (ref 6–8.5)
PROT UR QL STRIP: NEGATIVE
PROTHROMBIN TIME: 14.6 SECONDS (ref 11.4–14.4)
RBC # BLD AUTO: 5.39 10*6/MM3 (ref 4.14–5.8)
RBC # UR STRIP: NORMAL /HPF
REF LAB TEST METHOD: NORMAL
RH BLD: NEGATIVE
SARS-COV-2 RDRP RESP QL NAA+PROBE: NORMAL
SODIUM SERPL-SCNC: 135 MMOL/L (ref 136–145)
SP GR UR STRIP: 1.02 (ref 1–1.03)
SQUAMOUS #/AREA URNS HPF: NORMAL /HPF
T&S EXPIRATION DATE: NORMAL
UROBILINOGEN UR QL STRIP: ABNORMAL
WBC # UR STRIP: NORMAL /HPF
WBC NRBC COR # BLD: 8.73 10*3/MM3 (ref 3.4–10.8)

## 2022-03-17 PROCEDURE — 25010000002 ONDANSETRON PER 1 MG: Performed by: ANESTHESIOLOGY

## 2022-03-17 PROCEDURE — 87205 SMEAR GRAM STAIN: CPT | Performed by: SURGERY

## 2022-03-17 PROCEDURE — 25010000002 NEOSTIGMINE 10 MG/10ML SOLUTION: Performed by: ANESTHESIOLOGY

## 2022-03-17 PROCEDURE — 85610 PROTHROMBIN TIME: CPT | Performed by: SURGERY

## 2022-03-17 PROCEDURE — 86850 RBC ANTIBODY SCREEN: CPT | Performed by: SURGERY

## 2022-03-17 PROCEDURE — 86901 BLOOD TYPING SEROLOGIC RH(D): CPT | Performed by: SURGERY

## 2022-03-17 PROCEDURE — 86900 BLOOD TYPING SEROLOGIC ABO: CPT | Performed by: SURGERY

## 2022-03-17 PROCEDURE — 36415 COLL VENOUS BLD VENIPUNCTURE: CPT

## 2022-03-17 PROCEDURE — 25010000002 FENTANYL CITRATE (PF) 50 MCG/ML SOLUTION: Performed by: ANESTHESIOLOGY

## 2022-03-17 PROCEDURE — 0 LIDOCAINE 1 % SOLUTION: Performed by: ANESTHESIOLOGY

## 2022-03-17 PROCEDURE — 87635 SARS-COV-2 COVID-19 AMP PRB: CPT | Performed by: SURGERY

## 2022-03-17 PROCEDURE — 25010000002 DEXAMETHASONE PER 1 MG: Performed by: ANESTHESIOLOGY

## 2022-03-17 PROCEDURE — 25010000002 PIPERACILLIN SOD-TAZOBACTAM PER 1 G: Performed by: SURGERY

## 2022-03-17 PROCEDURE — 49507 PRP I/HERN INIT BLOCK >5 YR: CPT | Performed by: PHYSICIAN ASSISTANT

## 2022-03-17 PROCEDURE — 25010000002 ZIPRASIDONE MESYLATE PER 10 MG: Performed by: INTERNAL MEDICINE

## 2022-03-17 PROCEDURE — 85025 COMPLETE CBC W/AUTO DIFF WBC: CPT | Performed by: NURSE PRACTITIONER

## 2022-03-17 PROCEDURE — 74176 CT ABD & PELVIS W/O CONTRAST: CPT

## 2022-03-17 PROCEDURE — 85730 THROMBOPLASTIN TIME PARTIAL: CPT | Performed by: SURGERY

## 2022-03-17 PROCEDURE — 99284 EMERGENCY DEPT VISIT MOD MDM: CPT

## 2022-03-17 PROCEDURE — 0DBU0ZZ EXCISION OF OMENTUM, OPEN APPROACH: ICD-10-PCS | Performed by: SURGERY

## 2022-03-17 PROCEDURE — 81001 URINALYSIS AUTO W/SCOPE: CPT | Performed by: NURSE PRACTITIONER

## 2022-03-17 PROCEDURE — 87186 SC STD MICRODIL/AGAR DIL: CPT | Performed by: SURGERY

## 2022-03-17 PROCEDURE — 88302 TISSUE EXAM BY PATHOLOGIST: CPT | Performed by: SURGERY

## 2022-03-17 PROCEDURE — 87070 CULTURE OTHR SPECIMN AEROBIC: CPT | Performed by: SURGERY

## 2022-03-17 PROCEDURE — 25010000002 LORAZEPAM PER 2 MG: Performed by: FAMILY MEDICINE

## 2022-03-17 PROCEDURE — 25010000002 DEXAMETHASONE SODIUM PHOSPHATE 10 MG/ML SOLUTION: Performed by: NURSE ANESTHETIST, CERTIFIED REGISTERED

## 2022-03-17 PROCEDURE — 0YQ80ZZ REPAIR LEFT FEMORAL REGION, OPEN APPROACH: ICD-10-PCS | Performed by: SURGERY

## 2022-03-17 PROCEDURE — 87147 CULTURE TYPE IMMUNOLOGIC: CPT | Performed by: SURGERY

## 2022-03-17 PROCEDURE — 83605 ASSAY OF LACTIC ACID: CPT | Performed by: EMERGENCY MEDICINE

## 2022-03-17 PROCEDURE — 87015 SPECIMEN INFECT AGNT CONCNTJ: CPT | Performed by: SURGERY

## 2022-03-17 PROCEDURE — 87075 CULTR BACTERIA EXCEPT BLOOD: CPT | Performed by: SURGERY

## 2022-03-17 PROCEDURE — 99222 1ST HOSP IP/OBS MODERATE 55: CPT | Performed by: INTERNAL MEDICINE

## 2022-03-17 PROCEDURE — 25010000002 PROPOFOL 10 MG/ML EMULSION: Performed by: ANESTHESIOLOGY

## 2022-03-17 PROCEDURE — 80053 COMPREHEN METABOLIC PANEL: CPT | Performed by: NURSE PRACTITIONER

## 2022-03-17 RX ORDER — SODIUM CHLORIDE 9 MG/ML
75 INJECTION, SOLUTION INTRAVENOUS CONTINUOUS
Status: DISCONTINUED | OUTPATIENT
Start: 2022-03-17 | End: 2022-03-23

## 2022-03-17 RX ORDER — SODIUM CHLORIDE, SODIUM LACTATE, POTASSIUM CHLORIDE, CALCIUM CHLORIDE 600; 310; 30; 20 MG/100ML; MG/100ML; MG/100ML; MG/100ML
100 INJECTION, SOLUTION INTRAVENOUS CONTINUOUS
Status: DISCONTINUED | OUTPATIENT
Start: 2022-03-17 | End: 2022-03-18

## 2022-03-17 RX ORDER — NALOXONE HCL 0.4 MG/ML
0.4 VIAL (ML) INJECTION
Status: DISCONTINUED | OUTPATIENT
Start: 2022-03-17 | End: 2022-03-25 | Stop reason: HOSPADM

## 2022-03-17 RX ORDER — SODIUM CHLORIDE 0.9 % (FLUSH) 0.9 %
10 SYRINGE (ML) INJECTION AS NEEDED
Status: DISCONTINUED | OUTPATIENT
Start: 2022-03-17 | End: 2022-03-25 | Stop reason: HOSPADM

## 2022-03-17 RX ORDER — POTASSIUM CHLORIDE 1.5 G/1.77G
40 POWDER, FOR SOLUTION ORAL AS NEEDED
Status: DISCONTINUED | OUTPATIENT
Start: 2022-03-17 | End: 2022-03-25 | Stop reason: HOSPADM

## 2022-03-17 RX ORDER — ACETAMINOPHEN 325 MG/1
650 TABLET ORAL EVERY 4 HOURS PRN
Status: DISCONTINUED | OUTPATIENT
Start: 2022-03-17 | End: 2022-03-25 | Stop reason: HOSPADM

## 2022-03-17 RX ORDER — DEXAMETHASONE SODIUM PHOSPHATE 4 MG/ML
INJECTION, SOLUTION INTRA-ARTICULAR; INTRALESIONAL; INTRAMUSCULAR; INTRAVENOUS; SOFT TISSUE AS NEEDED
Status: DISCONTINUED | OUTPATIENT
Start: 2022-03-17 | End: 2022-03-17 | Stop reason: SURG

## 2022-03-17 RX ORDER — ONDANSETRON 4 MG/1
4 TABLET, FILM COATED ORAL EVERY 6 HOURS PRN
Status: DISCONTINUED | OUTPATIENT
Start: 2022-03-17 | End: 2022-03-25 | Stop reason: HOSPADM

## 2022-03-17 RX ORDER — LABETALOL HYDROCHLORIDE 5 MG/ML
INJECTION, SOLUTION INTRAVENOUS AS NEEDED
Status: DISCONTINUED | OUTPATIENT
Start: 2022-03-17 | End: 2022-03-17 | Stop reason: SURG

## 2022-03-17 RX ORDER — SODIUM CHLORIDE 0.9 % (FLUSH) 0.9 %
10 SYRINGE (ML) INJECTION EVERY 12 HOURS SCHEDULED
Status: DISCONTINUED | OUTPATIENT
Start: 2022-03-17 | End: 2022-03-25 | Stop reason: HOSPADM

## 2022-03-17 RX ORDER — NALOXONE HCL 0.4 MG/ML
0.1 VIAL (ML) INJECTION
Status: DISCONTINUED | OUTPATIENT
Start: 2022-03-17 | End: 2022-03-25 | Stop reason: HOSPADM

## 2022-03-17 RX ORDER — MAGNESIUM SULFATE HEPTAHYDRATE 40 MG/ML
2 INJECTION, SOLUTION INTRAVENOUS AS NEEDED
Status: DISCONTINUED | OUTPATIENT
Start: 2022-03-17 | End: 2022-03-17 | Stop reason: ALTCHOICE

## 2022-03-17 RX ORDER — BUPIVACAINE HCL/0.9 % NACL/PF 0.125 %
PLASTIC BAG, INJECTION (ML) EPIDURAL AS NEEDED
Status: DISCONTINUED | OUTPATIENT
Start: 2022-03-17 | End: 2022-03-17 | Stop reason: SURG

## 2022-03-17 RX ORDER — MORPHINE SULFATE 1 MG/ML
1 INJECTION, SOLUTION EPIDURAL; INTRATHECAL; INTRAVENOUS EVERY 4 HOURS PRN
Status: ACTIVE | OUTPATIENT
Start: 2022-03-17 | End: 2022-03-24

## 2022-03-17 RX ORDER — BUPIVACAINE HYDROCHLORIDE 2.5 MG/ML
INJECTION, SOLUTION EPIDURAL; INFILTRATION; INTRACAUDAL
Status: COMPLETED | OUTPATIENT
Start: 2022-03-17 | End: 2022-03-17

## 2022-03-17 RX ORDER — HYDROMORPHONE HYDROCHLORIDE 1 MG/ML
0.5 INJECTION, SOLUTION INTRAMUSCULAR; INTRAVENOUS; SUBCUTANEOUS
Status: DISCONTINUED | OUTPATIENT
Start: 2022-03-17 | End: 2022-03-17 | Stop reason: HOSPADM

## 2022-03-17 RX ORDER — FENTANYL CITRATE 50 UG/ML
INJECTION, SOLUTION INTRAMUSCULAR; INTRAVENOUS AS NEEDED
Status: DISCONTINUED | OUTPATIENT
Start: 2022-03-17 | End: 2022-03-17 | Stop reason: SURG

## 2022-03-17 RX ORDER — DEXAMETHASONE SODIUM PHOSPHATE 10 MG/ML
INJECTION, SOLUTION INTRAMUSCULAR; INTRAVENOUS
Status: COMPLETED | OUTPATIENT
Start: 2022-03-17 | End: 2022-03-17

## 2022-03-17 RX ORDER — FAMOTIDINE 20 MG/1
20 TABLET, FILM COATED ORAL 2 TIMES DAILY
Status: DISCONTINUED | OUTPATIENT
Start: 2022-03-17 | End: 2022-03-18

## 2022-03-17 RX ORDER — NEOSTIGMINE METHYLSULFATE 1 MG/ML
INJECTION, SOLUTION INTRAVENOUS AS NEEDED
Status: DISCONTINUED | OUTPATIENT
Start: 2022-03-17 | End: 2022-03-17 | Stop reason: SURG

## 2022-03-17 RX ORDER — LIDOCAINE HYDROCHLORIDE 10 MG/ML
INJECTION, SOLUTION INFILTRATION; PERINEURAL AS NEEDED
Status: DISCONTINUED | OUTPATIENT
Start: 2022-03-17 | End: 2022-03-17 | Stop reason: SURG

## 2022-03-17 RX ORDER — ONDANSETRON 2 MG/ML
INJECTION INTRAMUSCULAR; INTRAVENOUS AS NEEDED
Status: DISCONTINUED | OUTPATIENT
Start: 2022-03-17 | End: 2022-03-17 | Stop reason: SURG

## 2022-03-17 RX ORDER — HEPARIN SODIUM 5000 [USP'U]/ML
5000 INJECTION, SOLUTION INTRAVENOUS; SUBCUTANEOUS EVERY 8 HOURS SCHEDULED
Status: DISCONTINUED | OUTPATIENT
Start: 2022-03-18 | End: 2022-03-25 | Stop reason: HOSPADM

## 2022-03-17 RX ORDER — MAGNESIUM SULFATE HEPTAHYDRATE 40 MG/ML
4 INJECTION, SOLUTION INTRAVENOUS AS NEEDED
Status: DISCONTINUED | OUTPATIENT
Start: 2022-03-17 | End: 2022-03-25 | Stop reason: HOSPADM

## 2022-03-17 RX ORDER — POTASSIUM CHLORIDE 7.45 MG/ML
10 INJECTION INTRAVENOUS
Status: DISCONTINUED | OUTPATIENT
Start: 2022-03-17 | End: 2022-03-25 | Stop reason: HOSPADM

## 2022-03-17 RX ORDER — SODIUM CHLORIDE, SODIUM LACTATE, POTASSIUM CHLORIDE, CALCIUM CHLORIDE 600; 310; 30; 20 MG/100ML; MG/100ML; MG/100ML; MG/100ML
INJECTION, SOLUTION INTRAVENOUS CONTINUOUS PRN
Status: DISCONTINUED | OUTPATIENT
Start: 2022-03-17 | End: 2022-03-17 | Stop reason: SURG

## 2022-03-17 RX ORDER — ROCURONIUM BROMIDE 10 MG/ML
INJECTION, SOLUTION INTRAVENOUS AS NEEDED
Status: DISCONTINUED | OUTPATIENT
Start: 2022-03-17 | End: 2022-03-17 | Stop reason: SURG

## 2022-03-17 RX ORDER — MAGNESIUM HYDROXIDE 1200 MG/15ML
LIQUID ORAL AS NEEDED
Status: DISCONTINUED | OUTPATIENT
Start: 2022-03-17 | End: 2022-03-17 | Stop reason: HOSPADM

## 2022-03-17 RX ORDER — PROPOFOL 10 MG/ML
VIAL (ML) INTRAVENOUS AS NEEDED
Status: DISCONTINUED | OUTPATIENT
Start: 2022-03-17 | End: 2022-03-17 | Stop reason: SURG

## 2022-03-17 RX ORDER — ZIPRASIDONE MESYLATE 20 MG/ML
10 INJECTION, POWDER, LYOPHILIZED, FOR SOLUTION INTRAMUSCULAR EVERY 6 HOURS PRN
Status: DISCONTINUED | OUTPATIENT
Start: 2022-03-17 | End: 2022-03-25 | Stop reason: HOSPADM

## 2022-03-17 RX ORDER — CALCIUM CARBONATE 200(500)MG
2 TABLET,CHEWABLE ORAL 2 TIMES DAILY PRN
Status: DISCONTINUED | OUTPATIENT
Start: 2022-03-17 | End: 2022-03-25 | Stop reason: HOSPADM

## 2022-03-17 RX ORDER — GLYCOPYRROLATE 0.2 MG/ML
INJECTION INTRAMUSCULAR; INTRAVENOUS AS NEEDED
Status: DISCONTINUED | OUTPATIENT
Start: 2022-03-17 | End: 2022-03-17 | Stop reason: SURG

## 2022-03-17 RX ORDER — DOCUSATE SODIUM 100 MG/1
100 CAPSULE, LIQUID FILLED ORAL 2 TIMES DAILY PRN
Status: DISCONTINUED | OUTPATIENT
Start: 2022-03-17 | End: 2022-03-25 | Stop reason: HOSPADM

## 2022-03-17 RX ORDER — MAGNESIUM SULFATE HEPTAHYDRATE 40 MG/ML
4 INJECTION, SOLUTION INTRAVENOUS AS NEEDED
Status: DISCONTINUED | OUTPATIENT
Start: 2022-03-17 | End: 2022-03-17 | Stop reason: ALTCHOICE

## 2022-03-17 RX ORDER — ONDANSETRON 2 MG/ML
4 INJECTION INTRAMUSCULAR; INTRAVENOUS EVERY 6 HOURS PRN
Status: DISCONTINUED | OUTPATIENT
Start: 2022-03-17 | End: 2022-03-25 | Stop reason: HOSPADM

## 2022-03-17 RX ORDER — HYDROMORPHONE HCL 110MG/55ML
0.2 PATIENT CONTROLLED ANALGESIA SYRINGE INTRAVENOUS
Status: DISCONTINUED | OUTPATIENT
Start: 2022-03-17 | End: 2022-03-25 | Stop reason: HOSPADM

## 2022-03-17 RX ORDER — MAGNESIUM SULFATE HEPTAHYDRATE 40 MG/ML
2 INJECTION, SOLUTION INTRAVENOUS AS NEEDED
Status: DISCONTINUED | OUTPATIENT
Start: 2022-03-17 | End: 2022-03-25 | Stop reason: HOSPADM

## 2022-03-17 RX ORDER — HYDROCODONE BITARTRATE AND ACETAMINOPHEN 7.5; 325 MG/1; MG/1
1 TABLET ORAL EVERY 4 HOURS PRN
Status: ACTIVE | OUTPATIENT
Start: 2022-03-17 | End: 2022-03-24

## 2022-03-17 RX ORDER — LORAZEPAM 2 MG/ML
0.5 INJECTION INTRAMUSCULAR EVERY 8 HOURS PRN
Status: DISCONTINUED | OUTPATIENT
Start: 2022-03-17 | End: 2022-03-25 | Stop reason: HOSPADM

## 2022-03-17 RX ORDER — POTASSIUM CHLORIDE 750 MG/1
40 CAPSULE, EXTENDED RELEASE ORAL AS NEEDED
Status: DISCONTINUED | OUTPATIENT
Start: 2022-03-17 | End: 2022-03-25 | Stop reason: HOSPADM

## 2022-03-17 RX ORDER — FENTANYL CITRATE 50 UG/ML
50 INJECTION, SOLUTION INTRAMUSCULAR; INTRAVENOUS
Status: DISCONTINUED | OUTPATIENT
Start: 2022-03-17 | End: 2022-03-17 | Stop reason: HOSPADM

## 2022-03-17 RX ORDER — MAGNESIUM SULFATE 1 G/100ML
1 INJECTION INTRAVENOUS AS NEEDED
Status: DISCONTINUED | OUTPATIENT
Start: 2022-03-17 | End: 2022-03-25 | Stop reason: HOSPADM

## 2022-03-17 RX ORDER — LORAZEPAM 0.5 MG/1
0.5 TABLET ORAL EVERY 8 HOURS PRN
Status: DISCONTINUED | OUTPATIENT
Start: 2022-03-17 | End: 2022-03-17

## 2022-03-17 RX ADMIN — PROPOFOL 100 MG: 10 INJECTION, EMULSION INTRAVENOUS at 18:23

## 2022-03-17 RX ADMIN — ZIPRASIDONE MESYLATE 10 MG: 20 INJECTION, POWDER, LYOPHILIZED, FOR SOLUTION INTRAMUSCULAR at 22:53

## 2022-03-17 RX ADMIN — GLYCOPYRROLATE 0.4 MG: 0.2 INJECTION INTRAMUSCULAR; INTRAVENOUS at 19:23

## 2022-03-17 RX ADMIN — Medication 50 MCG: at 18:23

## 2022-03-17 RX ADMIN — SODIUM CHLORIDE, POTASSIUM CHLORIDE, SODIUM LACTATE AND CALCIUM CHLORIDE: 600; 310; 30; 20 INJECTION, SOLUTION INTRAVENOUS at 18:15

## 2022-03-17 RX ADMIN — BUPIVACAINE HYDROCHLORIDE 30 ML: 2.5 INJECTION, SOLUTION EPIDURAL; INFILTRATION; INTRACAUDAL; PERINEURAL at 15:12

## 2022-03-17 RX ADMIN — DEXAMETHASONE SODIUM PHOSPHATE 4 MG: 4 INJECTION INTRA-ARTICULAR; INTRALESIONAL; INTRAMUSCULAR; INTRAVENOUS; SOFT TISSUE at 18:33

## 2022-03-17 RX ADMIN — Medication 10 ML: at 21:15

## 2022-03-17 RX ADMIN — SODIUM CHLORIDE 500 ML: 9 INJECTION, SOLUTION INTRAVENOUS at 12:14

## 2022-03-17 RX ADMIN — ONDANSETRON 4 MG: 2 INJECTION INTRAMUSCULAR; INTRAVENOUS at 19:24

## 2022-03-17 RX ADMIN — ROCURONIUM BROMIDE 40 MG: 10 INJECTION INTRAVENOUS at 18:23

## 2022-03-17 RX ADMIN — NEOSTIGMINE METHYLSULFATE 2.5 MG: 0.5 INJECTION INTRAVENOUS at 19:23

## 2022-03-17 RX ADMIN — LIDOCAINE HYDROCHLORIDE 50 MG: 10 INJECTION, SOLUTION INFILTRATION; PERINEURAL at 18:23

## 2022-03-17 RX ADMIN — SODIUM CHLORIDE 75 ML/HR: 9 INJECTION, SOLUTION INTRAVENOUS at 21:14

## 2022-03-17 RX ADMIN — FENTANYL CITRATE 50 MCG: 50 INJECTION, SOLUTION INTRAMUSCULAR; INTRAVENOUS at 18:23

## 2022-03-17 RX ADMIN — LORAZEPAM 0.5 MG: 2 INJECTION INTRAMUSCULAR; INTRAVENOUS at 22:20

## 2022-03-17 RX ADMIN — LABETALOL 20 MG/4 ML (5 MG/ML) INTRAVENOUS SYRINGE 10 MG: at 18:59

## 2022-03-17 RX ADMIN — FENTANYL CITRATE 50 MCG: 50 INJECTION, SOLUTION INTRAMUSCULAR; INTRAVENOUS at 18:53

## 2022-03-17 RX ADMIN — TAZOBACTAM SODIUM AND PIPERACILLIN SODIUM 4.5 G: 500; 4 INJECTION, SOLUTION INTRAVENOUS at 18:25

## 2022-03-17 RX ADMIN — DEXAMETHASONE SODIUM PHOSPHATE 2 MG: 10 INJECTION, SOLUTION INTRAMUSCULAR; INTRAVENOUS at 15:12

## 2022-03-17 NOTE — ANESTHESIA PREPROCEDURE EVALUATION
Anesthesia Evaluation                  Airway   Mallampati: I  TM distance: >3 FB  Neck ROM: full  Dental      Pulmonary    Cardiovascular     ECG reviewed        Neuro/Psych  GI/Hepatic/Renal/Endo    (+)   renal disease,     Musculoskeletal     Abdominal    Substance History      OB/GYN          Other                        Anesthesia Plan    ASA 3 - emergent     general   (Tap)  intravenous induction     Anesthetic plan, all risks, benefits, and alternatives have been provided, discussed and informed consent has been obtained with: patient.    Plan discussed with CRNA.        CODE STATUS:    Code Status (Patient has no pulse and is not breathing): CPR (Attempt to Resuscitate)  Medical Interventions (Patient has pulse or is breathing): Full Support

## 2022-03-17 NOTE — ANESTHESIA POSTPROCEDURE EVALUATION
Patient: [place]    Procedure Summary     Date: 03/17/22 Room / Location:  [facility]  /  [facility]    Anesthesia Start: 1815 Anesthesia Stop: 1940    Procedure: INGUINAL HERNIA REPAIR (Left Groin) Diagnosis:     Surgeons: Hill Beckett MD Provider: Mk Fuller Jr., MD    Anesthesia Type: general ASA Status: 3 - Emergent          Anesthesia Type: general    Vitals  No vitals data found for the desired time range.          Post Anesthesia Care and Evaluation    Patient location during evaluation: PACU  Patient participation: complete - patient cannot participate  Level of consciousness: lethargic  Pain score: 2  Pain management: adequate  Airway patency: patent  Anesthetic complications: No anesthetic complications  PONV Status: none  Cardiovascular status: acceptable  Respiratory status: acceptable  Hydration status: acceptable

## 2022-03-17 NOTE — ANESTHESIA PROCEDURE NOTES
Peripheral IV    Patient location during procedure: pre-op  Start time: 3/17/2022 6:35 PM  End time: 3/17/2022 6:37 PM  Line placed for Difficult Access.  Performed By   Anesthesiologist: Mk Fuller Jr., MD  Preanesthetic Checklist  Completed: patient identified, IV checked, site marked, risks and benefits discussed, surgical consent, monitors and equipment checked, pre-op evaluation and timeout performed  Peripheral IV Prep   Patient position: supine   Prep: ChloraPrep  Peripheral IV Procedure   Laterality:left  Location:  Hand  Catheter size: 20 G         Post Assessment   Dressing Type: tape and transparent.    IV Dressing/Site: clean, dry and intact

## 2022-03-17 NOTE — ADDENDUM NOTE
Addendum  created 03/17/22 1943 by Giovani Lilly MD    Order list changed, Order sets accessed

## 2022-03-17 NOTE — ANESTHESIA PROCEDURE NOTES
Left TAP block      Patient reassessed immediately prior to procedure    Patient location during procedure: OR  Start time: 3/17/2022 6:25 PM  Stop time: 3/17/2022 6:28 PM  Reason for block: at surgeon's request and post-op pain management  Performed by  Anesthesiologist: Mk Fuller Jr., MD  Preanesthetic Checklist  Completed: patient identified, IV checked, site marked, risks and benefits discussed, surgical consent, monitors and equipment checked, pre-op evaluation and timeout performed  Prep:  Pt Position: supine  Sterile barriers:cap, gloves, sterile barriers and mask  Prep: ChloraPrep  Patient monitoring: blood pressure monitoring, continuous pulse oximetry and EKG  Procedure    Sedation: yes  Performed under: general  Guidance:ultrasound guided  Images:still images obtained, printed/placed on chart    Laterality:left  Block Type:TAP  Injection Technique:single-shot  Needle Type:short-bevel and echogenic  Needle Gauge:20 G  Resistance on Injection: none    Medications Used: dexamethasone sodium phosphate injection, 2 mg  bupivacaine PF (MARCAINE) 0.25 % injection, 30 mL      Medications  Comment:Block Injection:  LA dose divided between Right and Left block        Post Assessment  Injection Assessment: negative aspiration for heme, incremental injection and no paresthesia on injection  Patient Tolerance:comfortable throughout block  Complications:no  Additional Notes      Under Ultrasound guidance, a BBraun 4inch 360 degree needle was advanced with Normal Saline hydro dissection of tissue.  The Internal Oblique and Transversus Abdominus muscles where visualized.  At or before the aponeurosis of Internal Oblique, local anesthetic spread was visualized in the Transversus Abdominus Plane. Injection was made incrementally with aspiration every 5 mls.  There was no  intravascular injection,  injection pressure was normal, there was no neural injection, and the procedure was completed without difficulty.   Thank You.

## 2022-03-17 NOTE — ANESTHESIA PROCEDURE NOTES
Airway  Urgency: elective    Date/Time: 3/17/2022 6:25 PM  Airway not difficult    General Information and Staff    Patient location during procedure: OR  Anesthesiologist: Mk Fuller Jr., MD    Indications and Patient Condition  Indications for airway management: airway protection    Preoxygenated: yes  MILS not maintained throughout  Mask difficulty assessment: 2 - vent by mask + OA or adjuvant +/- NMBA    Final Airway Details  Final airway type: endotracheal airway      Successful airway: ETT  Cuffed: yes   Successful intubation technique: direct laryngoscopy  Endotracheal tube insertion site: oral  Blade: Pina  Blade size: 2  ETT size (mm): 7.0  Cormack-Lehane Classification: grade I - full view of glottis  Placement verified by: chest auscultation and capnometry   Measured from: lips  ETT/EBT  to lips (cm): 21  Number of attempts at approach: 1  Assessment: lips, teeth, and gum same as pre-op and atraumatic intubation    Additional Comments  Negative epigastric sounds, Breath sound equal bilaterally with symmetric chest rise and fall

## 2022-03-18 LAB
ANION GAP SERPL CALCULATED.3IONS-SCNC: 12 MMOL/L (ref 5–15)
BUN SERPL-MCNC: 32 MG/DL (ref 8–23)
BUN/CREAT SERPL: 18 (ref 7–25)
CALCIUM SPEC-SCNC: 8.9 MG/DL (ref 8.6–10.5)
CHLORIDE SERPL-SCNC: 104 MMOL/L (ref 98–107)
CO2 SERPL-SCNC: 21 MMOL/L (ref 22–29)
CREAT SERPL-MCNC: 1.78 MG/DL (ref 0.76–1.27)
DEPRECATED RDW RBC AUTO: 48.3 FL (ref 37–54)
EGFRCR SERPLBLD CKD-EPI 2021: 38.1 ML/MIN/1.73
ERYTHROCYTE [DISTWIDTH] IN BLOOD BY AUTOMATED COUNT: 14.4 % (ref 12.3–15.4)
GLUCOSE SERPL-MCNC: 162 MG/DL (ref 65–99)
HCT VFR BLD AUTO: 47 % (ref 37.5–51)
HGB BLD-MCNC: 15.9 G/DL (ref 13–17.7)
MCH RBC QN AUTO: 31.1 PG (ref 26.6–33)
MCHC RBC AUTO-ENTMCNC: 33.8 G/DL (ref 31.5–35.7)
MCV RBC AUTO: 92 FL (ref 79–97)
PLATELET # BLD AUTO: 129 10*3/MM3 (ref 140–450)
PMV BLD AUTO: 9.2 FL (ref 6–12)
POTASSIUM SERPL-SCNC: 5 MMOL/L (ref 3.5–5.2)
RBC # BLD AUTO: 5.11 10*6/MM3 (ref 4.14–5.8)
SODIUM SERPL-SCNC: 137 MMOL/L (ref 136–145)
WBC NRBC COR # BLD: 9.84 10*3/MM3 (ref 3.4–10.8)

## 2022-03-18 PROCEDURE — 25010000002 LORAZEPAM PER 2 MG: Performed by: FAMILY MEDICINE

## 2022-03-18 PROCEDURE — 80048 BASIC METABOLIC PNL TOTAL CA: CPT | Performed by: SURGERY

## 2022-03-18 PROCEDURE — 99233 SBSQ HOSP IP/OBS HIGH 50: CPT | Performed by: INTERNAL MEDICINE

## 2022-03-18 PROCEDURE — 25010000002 PIPERACILLIN SOD-TAZOBACTAM PER 1 G

## 2022-03-18 PROCEDURE — 85027 COMPLETE CBC AUTOMATED: CPT | Performed by: SURGERY

## 2022-03-18 PROCEDURE — 25010000002 PIPERACILLIN SOD-TAZOBACTAM PER 1 G: Performed by: SURGERY

## 2022-03-18 PROCEDURE — 25010000002 HEPARIN (PORCINE) PER 1000 UNITS: Performed by: SURGERY

## 2022-03-18 PROCEDURE — 25010000002 DAPTOMYCIN PER 1 MG: Performed by: INTERNAL MEDICINE

## 2022-03-18 PROCEDURE — 97166 OT EVAL MOD COMPLEX 45 MIN: CPT

## 2022-03-18 PROCEDURE — 25010000002 ZIPRASIDONE MESYLATE PER 10 MG: Performed by: INTERNAL MEDICINE

## 2022-03-18 PROCEDURE — 97162 PT EVAL MOD COMPLEX 30 MIN: CPT

## 2022-03-18 RX ORDER — FAMOTIDINE 20 MG/1
20 TABLET, FILM COATED ORAL EVERY OTHER DAY
Status: DISCONTINUED | OUTPATIENT
Start: 2022-03-18 | End: 2022-03-25 | Stop reason: HOSPADM

## 2022-03-18 RX ADMIN — SODIUM CHLORIDE 75 ML/HR: 9 INJECTION, SOLUTION INTRAVENOUS at 21:16

## 2022-03-18 RX ADMIN — TAZOBACTAM SODIUM AND PIPERACILLIN SODIUM 4.5 G: 500; 4 INJECTION, SOLUTION INTRAVENOUS at 00:53

## 2022-03-18 RX ADMIN — TAZOBACTAM SODIUM AND PIPERACILLIN SODIUM 4.5 G: 500; 4 INJECTION, SOLUTION INTRAVENOUS at 08:59

## 2022-03-18 RX ADMIN — HEPARIN SODIUM 5000 UNITS: 5000 INJECTION, SOLUTION INTRAVENOUS; SUBCUTANEOUS at 21:16

## 2022-03-18 RX ADMIN — HEPARIN SODIUM 5000 UNITS: 5000 INJECTION, SOLUTION INTRAVENOUS; SUBCUTANEOUS at 05:08

## 2022-03-18 RX ADMIN — ZIPRASIDONE MESYLATE 10 MG: 20 INJECTION, POWDER, LYOPHILIZED, FOR SOLUTION INTRAMUSCULAR at 21:15

## 2022-03-18 RX ADMIN — TAZOBACTAM SODIUM AND PIPERACILLIN SODIUM 4.5 G: 500; 4 INJECTION, SOLUTION INTRAVENOUS at 23:50

## 2022-03-18 RX ADMIN — Medication 10 ML: at 21:16

## 2022-03-18 RX ADMIN — ZIPRASIDONE MESYLATE 10 MG: 20 INJECTION, POWDER, LYOPHILIZED, FOR SOLUTION INTRAMUSCULAR at 14:45

## 2022-03-18 RX ADMIN — LORAZEPAM 0.5 MG: 2 INJECTION INTRAMUSCULAR; INTRAVENOUS at 23:39

## 2022-03-18 RX ADMIN — TAZOBACTAM SODIUM AND PIPERACILLIN SODIUM 4.5 G: 500; 4 INJECTION, SOLUTION INTRAVENOUS at 17:48

## 2022-03-18 RX ADMIN — DAPTOMYCIN 300 MG: 500 INJECTION, POWDER, LYOPHILIZED, FOR SOLUTION INTRAVENOUS at 12:20

## 2022-03-18 RX ADMIN — FAMOTIDINE 20 MG: 20 TABLET, FILM COATED ORAL at 08:59

## 2022-03-19 PROBLEM — G93.41 METABOLIC ENCEPHALOPATHY: Status: ACTIVE | Noted: 2022-03-19

## 2022-03-19 LAB
ALBUMIN SERPL-MCNC: 2.9 G/DL (ref 3.5–5.2)
ALBUMIN/GLOB SERPL: 1.2 G/DL
ALP SERPL-CCNC: 75 U/L (ref 39–117)
ALT SERPL W P-5'-P-CCNC: 20 U/L (ref 1–41)
ANION GAP SERPL CALCULATED.3IONS-SCNC: 9 MMOL/L (ref 5–15)
AST SERPL-CCNC: 25 U/L (ref 1–40)
BASOPHILS # BLD AUTO: 0.03 10*3/MM3 (ref 0–0.2)
BASOPHILS NFR BLD AUTO: 0.3 % (ref 0–1.5)
BILIRUB SERPL-MCNC: 0.7 MG/DL (ref 0–1.2)
BUN SERPL-MCNC: 26 MG/DL (ref 8–23)
BUN/CREAT SERPL: 17 (ref 7–25)
CALCIUM SPEC-SCNC: 8.7 MG/DL (ref 8.6–10.5)
CHLORIDE SERPL-SCNC: 108 MMOL/L (ref 98–107)
CO2 SERPL-SCNC: 21 MMOL/L (ref 22–29)
CREAT SERPL-MCNC: 1.53 MG/DL (ref 0.76–1.27)
DEPRECATED RDW RBC AUTO: 48.4 FL (ref 37–54)
EGFRCR SERPLBLD CKD-EPI 2021: 45.7 ML/MIN/1.73
EOSINOPHIL # BLD AUTO: 0.09 10*3/MM3 (ref 0–0.4)
EOSINOPHIL NFR BLD AUTO: 0.9 % (ref 0.3–6.2)
ERYTHROCYTE [DISTWIDTH] IN BLOOD BY AUTOMATED COUNT: 14.6 % (ref 12.3–15.4)
GLOBULIN UR ELPH-MCNC: 2.4 GM/DL
GLUCOSE SERPL-MCNC: 110 MG/DL (ref 65–99)
HCT VFR BLD AUTO: 45 % (ref 37.5–51)
HGB BLD-MCNC: 15.5 G/DL (ref 13–17.7)
IMM GRANULOCYTES # BLD AUTO: 0.04 10*3/MM3 (ref 0–0.05)
IMM GRANULOCYTES NFR BLD AUTO: 0.4 % (ref 0–0.5)
LYMPHOCYTES # BLD AUTO: 1.13 10*3/MM3 (ref 0.7–3.1)
LYMPHOCYTES NFR BLD AUTO: 11.3 % (ref 19.6–45.3)
MCH RBC QN AUTO: 31.3 PG (ref 26.6–33)
MCHC RBC AUTO-ENTMCNC: 34.4 G/DL (ref 31.5–35.7)
MCV RBC AUTO: 90.9 FL (ref 79–97)
MONOCYTES # BLD AUTO: 0.65 10*3/MM3 (ref 0.1–0.9)
MONOCYTES NFR BLD AUTO: 6.5 % (ref 5–12)
NEUTROPHILS NFR BLD AUTO: 8.06 10*3/MM3 (ref 1.7–7)
NEUTROPHILS NFR BLD AUTO: 80.6 % (ref 42.7–76)
NRBC BLD AUTO-RTO: 0 /100 WBC (ref 0–0.2)
PLATELET # BLD AUTO: 138 10*3/MM3 (ref 140–450)
PMV BLD AUTO: 9 FL (ref 6–12)
POTASSIUM SERPL-SCNC: 4.8 MMOL/L (ref 3.5–5.2)
PROT SERPL-MCNC: 5.3 G/DL (ref 6–8.5)
RBC # BLD AUTO: 4.95 10*6/MM3 (ref 4.14–5.8)
SODIUM SERPL-SCNC: 138 MMOL/L (ref 136–145)
WBC NRBC COR # BLD: 10 10*3/MM3 (ref 3.4–10.8)

## 2022-03-19 PROCEDURE — 85025 COMPLETE CBC W/AUTO DIFF WBC: CPT | Performed by: INTERNAL MEDICINE

## 2022-03-19 PROCEDURE — 25010000002 HEPARIN (PORCINE) PER 1000 UNITS: Performed by: SURGERY

## 2022-03-19 PROCEDURE — 25010000002 LORAZEPAM PER 2 MG: Performed by: FAMILY MEDICINE

## 2022-03-19 PROCEDURE — 82550 ASSAY OF CK (CPK): CPT | Performed by: INTERNAL MEDICINE

## 2022-03-19 PROCEDURE — 25010000002 PIPERACILLIN SOD-TAZOBACTAM PER 1 G

## 2022-03-19 PROCEDURE — 25010000002 ZIPRASIDONE MESYLATE PER 10 MG: Performed by: INTERNAL MEDICINE

## 2022-03-19 PROCEDURE — 93010 ELECTROCARDIOGRAM REPORT: CPT | Performed by: INTERNAL MEDICINE

## 2022-03-19 PROCEDURE — 99232 SBSQ HOSP IP/OBS MODERATE 35: CPT | Performed by: INTERNAL MEDICINE

## 2022-03-19 PROCEDURE — 93005 ELECTROCARDIOGRAM TRACING: CPT | Performed by: INTERNAL MEDICINE

## 2022-03-19 PROCEDURE — 80053 COMPREHEN METABOLIC PANEL: CPT | Performed by: INTERNAL MEDICINE

## 2022-03-19 RX ADMIN — HEPARIN SODIUM 5000 UNITS: 5000 INJECTION, SOLUTION INTRAVENOUS; SUBCUTANEOUS at 14:39

## 2022-03-19 RX ADMIN — HEPARIN SODIUM 5000 UNITS: 5000 INJECTION, SOLUTION INTRAVENOUS; SUBCUTANEOUS at 22:27

## 2022-03-19 RX ADMIN — Medication 10 ML: at 22:27

## 2022-03-19 RX ADMIN — TAZOBACTAM SODIUM AND PIPERACILLIN SODIUM 4.5 G: 500; 4 INJECTION, SOLUTION INTRAVENOUS at 15:34

## 2022-03-19 RX ADMIN — ZIPRASIDONE MESYLATE 10 MG: 20 INJECTION, POWDER, LYOPHILIZED, FOR SOLUTION INTRAMUSCULAR at 04:52

## 2022-03-19 RX ADMIN — TAZOBACTAM SODIUM AND PIPERACILLIN SODIUM 4.5 G: 500; 4 INJECTION, SOLUTION INTRAVENOUS at 08:45

## 2022-03-19 RX ADMIN — ZIPRASIDONE MESYLATE 10 MG: 20 INJECTION, POWDER, LYOPHILIZED, FOR SOLUTION INTRAMUSCULAR at 22:26

## 2022-03-19 RX ADMIN — LORAZEPAM 0.5 MG: 2 INJECTION INTRAMUSCULAR; INTRAVENOUS at 08:45

## 2022-03-19 RX ADMIN — SODIUM CHLORIDE 75 ML/HR: 9 INJECTION, SOLUTION INTRAVENOUS at 14:39

## 2022-03-19 RX ADMIN — TAZOBACTAM SODIUM AND PIPERACILLIN SODIUM 4.5 G: 500; 4 INJECTION, SOLUTION INTRAVENOUS at 23:43

## 2022-03-20 PROBLEM — E44.0 MODERATE MALNUTRITION (HCC): Status: ACTIVE | Noted: 2022-03-20

## 2022-03-20 LAB
BACTERIA SPEC AEROBE CULT: ABNORMAL
CK SERPL-CCNC: 39 U/L (ref 20–200)
GRAM STN SPEC: ABNORMAL
GRAM STN SPEC: ABNORMAL

## 2022-03-20 PROCEDURE — 25010000002 LORAZEPAM PER 2 MG: Performed by: FAMILY MEDICINE

## 2022-03-20 PROCEDURE — 25010000002 DAPTOMYCIN PER 1 MG: Performed by: INTERNAL MEDICINE

## 2022-03-20 PROCEDURE — 25010000002 HEPARIN (PORCINE) PER 1000 UNITS: Performed by: SURGERY

## 2022-03-20 PROCEDURE — 25010000002 HALOPERIDOL LACTATE PER 5 MG: Performed by: INTERNAL MEDICINE

## 2022-03-20 PROCEDURE — 99232 SBSQ HOSP IP/OBS MODERATE 35: CPT | Performed by: INTERNAL MEDICINE

## 2022-03-20 PROCEDURE — 25010000002 PIPERACILLIN SOD-TAZOBACTAM PER 1 G

## 2022-03-20 RX ORDER — HALOPERIDOL 5 MG/ML
2 INJECTION INTRAMUSCULAR ONCE
Status: COMPLETED | OUTPATIENT
Start: 2022-03-20 | End: 2022-03-20

## 2022-03-20 RX ADMIN — FAMOTIDINE 20 MG: 20 TABLET, FILM COATED ORAL at 08:37

## 2022-03-20 RX ADMIN — SODIUM CHLORIDE 75 ML/HR: 9 INJECTION, SOLUTION INTRAVENOUS at 06:46

## 2022-03-20 RX ADMIN — LORAZEPAM 0.5 MG: 2 INJECTION INTRAMUSCULAR; INTRAVENOUS at 13:25

## 2022-03-20 RX ADMIN — HALOPERIDOL LACTATE 2 MG: 5 INJECTION, SOLUTION INTRAMUSCULAR at 14:09

## 2022-03-20 RX ADMIN — DAPTOMYCIN 400 MG: 500 INJECTION, POWDER, LYOPHILIZED, FOR SOLUTION INTRAVENOUS at 13:08

## 2022-03-20 RX ADMIN — TAZOBACTAM SODIUM AND PIPERACILLIN SODIUM 4.5 G: 500; 4 INJECTION, SOLUTION INTRAVENOUS at 15:54

## 2022-03-20 RX ADMIN — HEPARIN SODIUM 5000 UNITS: 5000 INJECTION, SOLUTION INTRAVENOUS; SUBCUTANEOUS at 22:05

## 2022-03-20 RX ADMIN — TAZOBACTAM SODIUM AND PIPERACILLIN SODIUM 4.5 G: 500; 4 INJECTION, SOLUTION INTRAVENOUS at 08:37

## 2022-03-20 RX ADMIN — LORAZEPAM 0.5 MG: 2 INJECTION INTRAMUSCULAR; INTRAVENOUS at 22:08

## 2022-03-20 RX ADMIN — Medication 10 ML: at 22:05

## 2022-03-20 RX ADMIN — HEPARIN SODIUM 5000 UNITS: 5000 INJECTION, SOLUTION INTRAVENOUS; SUBCUTANEOUS at 06:46

## 2022-03-20 RX ADMIN — SODIUM CHLORIDE 75 ML/HR: 9 INJECTION, SOLUTION INTRAVENOUS at 18:25

## 2022-03-20 RX ADMIN — HEPARIN SODIUM 5000 UNITS: 5000 INJECTION, SOLUTION INTRAVENOUS; SUBCUTANEOUS at 13:10

## 2022-03-21 LAB
CYTO UR: NORMAL
LAB AP CASE REPORT: NORMAL
LAB AP CLINICAL INFORMATION: NORMAL
PATH REPORT.FINAL DX SPEC: NORMAL
PATH REPORT.GROSS SPEC: NORMAL
QT INTERVAL: 436 MS
QTC INTERVAL: 460 MS

## 2022-03-21 PROCEDURE — 97530 THERAPEUTIC ACTIVITIES: CPT

## 2022-03-21 PROCEDURE — 25010000002 LORAZEPAM PER 2 MG: Performed by: FAMILY MEDICINE

## 2022-03-21 PROCEDURE — 25010000002 ZIPRASIDONE MESYLATE PER 10 MG: Performed by: INTERNAL MEDICINE

## 2022-03-21 PROCEDURE — 25010000002 HEPARIN (PORCINE) PER 1000 UNITS: Performed by: SURGERY

## 2022-03-21 PROCEDURE — 25010000002 PIPERACILLIN SOD-TAZOBACTAM PER 1 G

## 2022-03-21 PROCEDURE — 99232 SBSQ HOSP IP/OBS MODERATE 35: CPT | Performed by: INTERNAL MEDICINE

## 2022-03-21 PROCEDURE — 97116 GAIT TRAINING THERAPY: CPT

## 2022-03-21 RX ADMIN — HEPARIN SODIUM 5000 UNITS: 5000 INJECTION, SOLUTION INTRAVENOUS; SUBCUTANEOUS at 07:51

## 2022-03-21 RX ADMIN — TAZOBACTAM SODIUM AND PIPERACILLIN SODIUM 4.5 G: 500; 4 INJECTION, SOLUTION INTRAVENOUS at 16:36

## 2022-03-21 RX ADMIN — TAZOBACTAM SODIUM AND PIPERACILLIN SODIUM 4.5 G: 500; 4 INJECTION, SOLUTION INTRAVENOUS at 01:06

## 2022-03-21 RX ADMIN — SODIUM CHLORIDE 75 ML/HR: 9 INJECTION, SOLUTION INTRAVENOUS at 07:52

## 2022-03-21 RX ADMIN — ZIPRASIDONE MESYLATE 10 MG: 20 INJECTION, POWDER, LYOPHILIZED, FOR SOLUTION INTRAMUSCULAR at 22:27

## 2022-03-21 RX ADMIN — HEPARIN SODIUM 5000 UNITS: 5000 INJECTION, SOLUTION INTRAVENOUS; SUBCUTANEOUS at 15:18

## 2022-03-21 RX ADMIN — SODIUM CHLORIDE 75 ML/HR: 9 INJECTION, SOLUTION INTRAVENOUS at 23:24

## 2022-03-21 RX ADMIN — Medication 10 ML: at 09:17

## 2022-03-21 RX ADMIN — HEPARIN SODIUM 5000 UNITS: 5000 INJECTION, SOLUTION INTRAVENOUS; SUBCUTANEOUS at 22:21

## 2022-03-21 RX ADMIN — Medication 10 ML: at 22:21

## 2022-03-21 RX ADMIN — TAZOBACTAM SODIUM AND PIPERACILLIN SODIUM 4.5 G: 500; 4 INJECTION, SOLUTION INTRAVENOUS at 23:58

## 2022-03-21 RX ADMIN — TAZOBACTAM SODIUM AND PIPERACILLIN SODIUM 4.5 G: 500; 4 INJECTION, SOLUTION INTRAVENOUS at 09:17

## 2022-03-21 RX ADMIN — LORAZEPAM 0.5 MG: 2 INJECTION INTRAMUSCULAR; INTRAVENOUS at 17:51

## 2022-03-22 LAB
ALBUMIN SERPL-MCNC: 3.4 G/DL (ref 3.5–5.2)
ALBUMIN/GLOB SERPL: 2 G/DL
ALP SERPL-CCNC: 78 U/L (ref 39–117)
ALT SERPL W P-5'-P-CCNC: 19 U/L (ref 1–41)
ANION GAP SERPL CALCULATED.3IONS-SCNC: 8 MMOL/L (ref 5–15)
AST SERPL-CCNC: 21 U/L (ref 1–40)
BACTERIA SPEC ANAEROBE CULT: NORMAL
BILIRUB SERPL-MCNC: 0.8 MG/DL (ref 0–1.2)
BUN SERPL-MCNC: 17 MG/DL (ref 8–23)
BUN/CREAT SERPL: 10.3 (ref 7–25)
CALCIUM SPEC-SCNC: 8.9 MG/DL (ref 8.6–10.5)
CHLORIDE SERPL-SCNC: 106 MMOL/L (ref 98–107)
CO2 SERPL-SCNC: 25 MMOL/L (ref 22–29)
CREAT SERPL-MCNC: 1.65 MG/DL (ref 0.76–1.27)
DEPRECATED RDW RBC AUTO: 45.6 FL (ref 37–54)
EGFRCR SERPLBLD CKD-EPI 2021: 41.7 ML/MIN/1.73
ERYTHROCYTE [DISTWIDTH] IN BLOOD BY AUTOMATED COUNT: 14.3 % (ref 12.3–15.4)
GLOBULIN UR ELPH-MCNC: 1.7 GM/DL
GLUCOSE SERPL-MCNC: 97 MG/DL (ref 65–99)
HCT VFR BLD AUTO: 44.7 % (ref 37.5–51)
HGB BLD-MCNC: 15.7 G/DL (ref 13–17.7)
MCH RBC QN AUTO: 30.8 PG (ref 26.6–33)
MCHC RBC AUTO-ENTMCNC: 35.1 G/DL (ref 31.5–35.7)
MCV RBC AUTO: 87.6 FL (ref 79–97)
PLATELET # BLD AUTO: 143 10*3/MM3 (ref 140–450)
PMV BLD AUTO: 8.9 FL (ref 6–12)
POTASSIUM SERPL-SCNC: 4.4 MMOL/L (ref 3.5–5.2)
PROT SERPL-MCNC: 5.1 G/DL (ref 6–8.5)
RBC # BLD AUTO: 5.1 10*6/MM3 (ref 4.14–5.8)
SODIUM SERPL-SCNC: 139 MMOL/L (ref 136–145)
WBC NRBC COR # BLD: 10.65 10*3/MM3 (ref 3.4–10.8)

## 2022-03-22 PROCEDURE — 99232 SBSQ HOSP IP/OBS MODERATE 35: CPT | Performed by: INTERNAL MEDICINE

## 2022-03-22 PROCEDURE — 97530 THERAPEUTIC ACTIVITIES: CPT

## 2022-03-22 PROCEDURE — 99231 SBSQ HOSP IP/OBS SF/LOW 25: CPT | Performed by: NURSE PRACTITIONER

## 2022-03-22 PROCEDURE — 25010000002 HEPARIN (PORCINE) PER 1000 UNITS: Performed by: SURGERY

## 2022-03-22 PROCEDURE — 85027 COMPLETE CBC AUTOMATED: CPT | Performed by: INTERNAL MEDICINE

## 2022-03-22 PROCEDURE — 80053 COMPREHEN METABOLIC PANEL: CPT | Performed by: INTERNAL MEDICINE

## 2022-03-22 PROCEDURE — 25010000002 LORAZEPAM PER 2 MG: Performed by: FAMILY MEDICINE

## 2022-03-22 PROCEDURE — 25010000002 PIPERACILLIN SOD-TAZOBACTAM PER 1 G

## 2022-03-22 PROCEDURE — 97110 THERAPEUTIC EXERCISES: CPT

## 2022-03-22 RX ORDER — L.ACID,PARA/B.BIFIDUM/S.THERM 8B CELL
1 CAPSULE ORAL DAILY
Status: DISCONTINUED | OUTPATIENT
Start: 2022-03-22 | End: 2022-03-25 | Stop reason: HOSPADM

## 2022-03-22 RX ORDER — NYSTATIN 100000 [USP'U]/G
POWDER TOPICAL EVERY 12 HOURS SCHEDULED
Status: DISCONTINUED | OUTPATIENT
Start: 2022-03-22 | End: 2022-03-25 | Stop reason: HOSPADM

## 2022-03-22 RX ADMIN — NYSTATIN: 100000 POWDER TOPICAL at 12:15

## 2022-03-22 RX ADMIN — HEPARIN SODIUM 5000 UNITS: 5000 INJECTION, SOLUTION INTRAVENOUS; SUBCUTANEOUS at 13:49

## 2022-03-22 RX ADMIN — HEPARIN SODIUM 5000 UNITS: 5000 INJECTION, SOLUTION INTRAVENOUS; SUBCUTANEOUS at 05:51

## 2022-03-22 RX ADMIN — FAMOTIDINE 20 MG: 20 TABLET, FILM COATED ORAL at 08:47

## 2022-03-22 RX ADMIN — Medication 10 ML: at 08:48

## 2022-03-22 RX ADMIN — Medication 1 CAPSULE: at 12:15

## 2022-03-22 RX ADMIN — TAZOBACTAM SODIUM AND PIPERACILLIN SODIUM 4.5 G: 500; 4 INJECTION, SOLUTION INTRAVENOUS at 08:47

## 2022-03-22 RX ADMIN — TAZOBACTAM SODIUM AND PIPERACILLIN SODIUM 4.5 G: 500; 4 INJECTION, SOLUTION INTRAVENOUS at 16:12

## 2022-03-22 RX ADMIN — SODIUM CHLORIDE 75 ML/HR: 9 INJECTION, SOLUTION INTRAVENOUS at 08:48

## 2022-03-22 RX ADMIN — LORAZEPAM 0.5 MG: 2 INJECTION INTRAMUSCULAR; INTRAVENOUS at 19:07

## 2022-03-23 PROCEDURE — 25010000002 HEPARIN (PORCINE) PER 1000 UNITS: Performed by: SURGERY

## 2022-03-23 PROCEDURE — 25010000002 PIPERACILLIN SOD-TAZOBACTAM PER 1 G

## 2022-03-23 PROCEDURE — 97116 GAIT TRAINING THERAPY: CPT

## 2022-03-23 PROCEDURE — 97110 THERAPEUTIC EXERCISES: CPT

## 2022-03-23 PROCEDURE — 25010000002 CEFTRIAXONE PER 250 MG: Performed by: INTERNAL MEDICINE

## 2022-03-23 PROCEDURE — 99232 SBSQ HOSP IP/OBS MODERATE 35: CPT | Performed by: NURSE PRACTITIONER

## 2022-03-23 PROCEDURE — 97530 THERAPEUTIC ACTIVITIES: CPT

## 2022-03-23 RX ORDER — METRONIDAZOLE 500 MG/1
500 TABLET ORAL EVERY 8 HOURS SCHEDULED
Status: DISCONTINUED | OUTPATIENT
Start: 2022-03-23 | End: 2022-03-25 | Stop reason: HOSPADM

## 2022-03-23 RX ADMIN — SODIUM CHLORIDE 75 ML/HR: 9 INJECTION, SOLUTION INTRAVENOUS at 03:02

## 2022-03-23 RX ADMIN — TAZOBACTAM SODIUM AND PIPERACILLIN SODIUM 4.5 G: 500; 4 INJECTION, SOLUTION INTRAVENOUS at 00:58

## 2022-03-23 RX ADMIN — NYSTATIN: 100000 POWDER TOPICAL at 00:58

## 2022-03-23 RX ADMIN — HEPARIN SODIUM 5000 UNITS: 5000 INJECTION, SOLUTION INTRAVENOUS; SUBCUTANEOUS at 13:29

## 2022-03-23 RX ADMIN — Medication 10 ML: at 08:10

## 2022-03-23 RX ADMIN — Medication 10 ML: at 21:15

## 2022-03-23 RX ADMIN — Medication 10 ML: at 00:58

## 2022-03-23 RX ADMIN — HEPARIN SODIUM 5000 UNITS: 5000 INJECTION, SOLUTION INTRAVENOUS; SUBCUTANEOUS at 06:23

## 2022-03-23 RX ADMIN — NYSTATIN: 100000 POWDER TOPICAL at 21:15

## 2022-03-23 RX ADMIN — HEPARIN SODIUM 5000 UNITS: 5000 INJECTION, SOLUTION INTRAVENOUS; SUBCUTANEOUS at 21:15

## 2022-03-23 RX ADMIN — METRONIDAZOLE 500 MG: 500 TABLET ORAL at 21:15

## 2022-03-23 RX ADMIN — SODIUM CHLORIDE 2 G: 900 INJECTION INTRAVENOUS at 11:13

## 2022-03-23 RX ADMIN — TAZOBACTAM SODIUM AND PIPERACILLIN SODIUM 4.5 G: 500; 4 INJECTION, SOLUTION INTRAVENOUS at 07:53

## 2022-03-23 RX ADMIN — HEPARIN SODIUM 5000 UNITS: 5000 INJECTION, SOLUTION INTRAVENOUS; SUBCUTANEOUS at 00:58

## 2022-03-23 RX ADMIN — METRONIDAZOLE 500 MG: 500 TABLET ORAL at 13:29

## 2022-03-23 RX ADMIN — NYSTATIN: 100000 POWDER TOPICAL at 08:10

## 2022-03-23 RX ADMIN — Medication 1 CAPSULE: at 08:10

## 2022-03-24 ENCOUNTER — TELEPHONE (OUTPATIENT)
Dept: ONCOLOGY | Facility: CLINIC | Age: 82
End: 2022-03-24

## 2022-03-24 LAB
ALBUMIN SERPL-MCNC: 3.1 G/DL (ref 3.5–5.2)
ALBUMIN/GLOB SERPL: 1.4 G/DL
ALP SERPL-CCNC: 74 U/L (ref 39–117)
ALT SERPL W P-5'-P-CCNC: 15 U/L (ref 1–41)
ANION GAP SERPL CALCULATED.3IONS-SCNC: 10 MMOL/L (ref 5–15)
AST SERPL-CCNC: 17 U/L (ref 1–40)
BILIRUB SERPL-MCNC: 0.3 MG/DL (ref 0–1.2)
BUN SERPL-MCNC: 17 MG/DL (ref 8–23)
BUN/CREAT SERPL: 13.5 (ref 7–25)
CALCIUM SPEC-SCNC: 8.8 MG/DL (ref 8.6–10.5)
CHLORIDE SERPL-SCNC: 105 MMOL/L (ref 98–107)
CO2 SERPL-SCNC: 22 MMOL/L (ref 22–29)
CREAT SERPL-MCNC: 1.26 MG/DL (ref 0.76–1.27)
DEPRECATED RDW RBC AUTO: 50.2 FL (ref 37–54)
EGFRCR SERPLBLD CKD-EPI 2021: 57.7 ML/MIN/1.73
ERYTHROCYTE [DISTWIDTH] IN BLOOD BY AUTOMATED COUNT: 14.6 % (ref 12.3–15.4)
GLOBULIN UR ELPH-MCNC: 2.2 GM/DL
GLUCOSE SERPL-MCNC: 111 MG/DL (ref 65–99)
HCT VFR BLD AUTO: 44.3 % (ref 37.5–51)
HGB BLD-MCNC: 14.8 G/DL (ref 13–17.7)
MCH RBC QN AUTO: 31.5 PG (ref 26.6–33)
MCHC RBC AUTO-ENTMCNC: 33.4 G/DL (ref 31.5–35.7)
MCV RBC AUTO: 94.3 FL (ref 79–97)
PLATELET # BLD AUTO: 140 10*3/MM3 (ref 140–450)
PMV BLD AUTO: 8.9 FL (ref 6–12)
POTASSIUM SERPL-SCNC: 4 MMOL/L (ref 3.5–5.2)
PROT SERPL-MCNC: 5.3 G/DL (ref 6–8.5)
RBC # BLD AUTO: 4.7 10*6/MM3 (ref 4.14–5.8)
SODIUM SERPL-SCNC: 137 MMOL/L (ref 136–145)
WBC NRBC COR # BLD: 8.3 10*3/MM3 (ref 3.4–10.8)

## 2022-03-24 PROCEDURE — 25010000002 HEPARIN (PORCINE) PER 1000 UNITS: Performed by: SURGERY

## 2022-03-24 PROCEDURE — 85027 COMPLETE CBC AUTOMATED: CPT | Performed by: INTERNAL MEDICINE

## 2022-03-24 PROCEDURE — 80053 COMPREHEN METABOLIC PANEL: CPT | Performed by: INTERNAL MEDICINE

## 2022-03-24 PROCEDURE — 25010000002 CEFTRIAXONE PER 250 MG: Performed by: INTERNAL MEDICINE

## 2022-03-24 PROCEDURE — 99232 SBSQ HOSP IP/OBS MODERATE 35: CPT | Performed by: NURSE PRACTITIONER

## 2022-03-24 RX ORDER — CLOTRIMAZOLE AND BETAMETHASONE DIPROPIONATE 10; .64 MG/G; MG/G
1 CREAM TOPICAL EVERY 12 HOURS SCHEDULED
Status: DISCONTINUED | OUTPATIENT
Start: 2022-03-24 | End: 2022-03-25 | Stop reason: HOSPADM

## 2022-03-24 RX ADMIN — Medication 1 CAPSULE: at 08:08

## 2022-03-24 RX ADMIN — SODIUM CHLORIDE 2 G: 900 INJECTION INTRAVENOUS at 08:08

## 2022-03-24 RX ADMIN — NYSTATIN 1 APPLICATION: 100000 POWDER TOPICAL at 20:55

## 2022-03-24 RX ADMIN — FAMOTIDINE 20 MG: 20 TABLET, FILM COATED ORAL at 08:08

## 2022-03-24 RX ADMIN — Medication 10 ML: at 11:53

## 2022-03-24 RX ADMIN — HEPARIN SODIUM 5000 UNITS: 5000 INJECTION, SOLUTION INTRAVENOUS; SUBCUTANEOUS at 14:14

## 2022-03-24 RX ADMIN — METRONIDAZOLE 500 MG: 500 TABLET ORAL at 21:08

## 2022-03-24 RX ADMIN — HEPARIN SODIUM 5000 UNITS: 5000 INJECTION, SOLUTION INTRAVENOUS; SUBCUTANEOUS at 05:37

## 2022-03-24 RX ADMIN — METRONIDAZOLE 500 MG: 500 TABLET ORAL at 14:14

## 2022-03-24 RX ADMIN — METRONIDAZOLE 500 MG: 500 TABLET ORAL at 05:37

## 2022-03-24 RX ADMIN — HEPARIN SODIUM 5000 UNITS: 5000 INJECTION, SOLUTION INTRAVENOUS; SUBCUTANEOUS at 21:08

## 2022-03-24 RX ADMIN — NYSTATIN: 100000 POWDER TOPICAL at 11:51

## 2022-03-24 RX ADMIN — Medication 10 ML: at 20:55

## 2022-03-24 RX ADMIN — CLOTRIMAZOLE AND BETAMETHASONE DIPROPIONATE 1 APPLICATION: 10; .5 CREAM TOPICAL at 20:55

## 2022-03-24 NOTE — TELEPHONE ENCOUNTER
Caller: NICOLE SHARIF    Relationship: Spouse    Best call back number: [phone]    What is the best time to reach you: ANY    Who are you requesting to speak with (clinical staff, provider,  specific staff member): DR BURNS OR A NURSE    What was the call regarding: PT IS CURRENTLY IN THE HOSPITAL AND IS SUPPOSED TO BE DISCHARGED TOMORROW. STATES PT WILL BE GOING INTO REHAB AFTER HE IS DISCHARGED FOR AT LEAST A WEEK. ASKS IF DR BURNS FEELS THE PT'S APPTS ON 3/30/22 SHOULD BE POSTPONED UNTIL AFTER REHAB    Do you require a callback: YES

## 2022-03-24 NOTE — TELEPHONE ENCOUNTER
Provider: DR BURNS  Caller: GAYLE    Reason for Call: GAYLE IS NEEDING A CPT CODE FOR BIOPSY AND DIAGNOSIS CODE   AND DATES OF SERVICE FOR HIS CHEMO TREATMENT    THIS IS FOR HIS PLAN    REFERENCE NUMBER [id number]

## 2022-03-24 NOTE — TELEPHONE ENCOUNTER
I returned Heather's phone call and advised her that Dr. Lieberman is out this week and I will forward to his nurse to ask him on Monday.  I advised her most likely he will want him pushed out until after he is done with rehab but that we will check and get back with them next week.

## 2022-03-25 VITALS
HEART RATE: 73 BPM | DIASTOLIC BLOOD PRESSURE: 64 MMHG | WEIGHT: 146 LBS | SYSTOLIC BLOOD PRESSURE: 121 MMHG | HEIGHT: 65 IN | BODY MASS INDEX: 24.32 KG/M2 | OXYGEN SATURATION: 96 % | RESPIRATION RATE: 18 BRPM | TEMPERATURE: 97.4 F

## 2022-03-25 LAB — SARS-COV-2 RDRP RESP QL NAA+PROBE: NORMAL

## 2022-03-25 PROCEDURE — 87635 SARS-COV-2 COVID-19 AMP PRB: CPT | Performed by: INTERNAL MEDICINE

## 2022-03-25 PROCEDURE — 99239 HOSP IP/OBS DSCHRG MGMT >30: CPT | Performed by: NURSE PRACTITIONER

## 2022-03-25 PROCEDURE — 25010000002 HEPARIN (PORCINE) PER 1000 UNITS: Performed by: SURGERY

## 2022-03-25 PROCEDURE — 25010000002 CEFTRIAXONE PER 250 MG: Performed by: INTERNAL MEDICINE

## 2022-03-25 PROCEDURE — 97530 THERAPEUTIC ACTIVITIES: CPT

## 2022-03-25 PROCEDURE — 97535 SELF CARE MNGMENT TRAINING: CPT

## 2022-03-25 RX ORDER — L.ACID,PARA/B.BIFIDUM/S.THERM 8B CELL
1 CAPSULE ORAL DAILY
Qty: 30 CAPSULE | Refills: 0 | Status: SHIPPED | OUTPATIENT
Start: 2022-03-26

## 2022-03-25 RX ORDER — NYSTATIN 100000 [USP'U]/G
POWDER TOPICAL EVERY 12 HOURS SCHEDULED
Qty: 30 G | Refills: 0 | Status: SHIPPED | OUTPATIENT
Start: 2022-03-25

## 2022-03-25 RX ORDER — METRONIDAZOLE 500 MG/1
500 TABLET ORAL EVERY 8 HOURS SCHEDULED
Qty: 21 TABLET | Refills: 0 | Status: SHIPPED | OUTPATIENT
Start: 2022-03-25 | End: 2022-04-01

## 2022-03-25 RX ORDER — CEFDINIR 300 MG/1
300 CAPSULE ORAL 2 TIMES DAILY
Qty: 14 CAPSULE | Refills: 0 | Status: SHIPPED | OUTPATIENT
Start: 2022-03-25

## 2022-03-25 RX ADMIN — HEPARIN SODIUM 5000 UNITS: 5000 INJECTION, SOLUTION INTRAVENOUS; SUBCUTANEOUS at 05:49

## 2022-03-25 RX ADMIN — CLOTRIMAZOLE AND BETAMETHASONE DIPROPIONATE 1 APPLICATION: 10; .5 CREAM TOPICAL at 09:36

## 2022-03-25 RX ADMIN — METRONIDAZOLE 500 MG: 500 TABLET ORAL at 05:49

## 2022-03-25 RX ADMIN — Medication 1 CAPSULE: at 09:31

## 2022-03-25 RX ADMIN — NYSTATIN: 100000 POWDER TOPICAL at 09:31

## 2022-03-25 RX ADMIN — Medication 10 ML: at 09:32

## 2022-03-25 RX ADMIN — SODIUM CHLORIDE 2 G: 900 INJECTION INTRAVENOUS at 09:31

## 2022-03-28 ENCOUNTER — TELEPHONE (OUTPATIENT)
Dept: ONCOLOGY | Facility: CLINIC | Age: 82
End: 2022-03-28

## 2022-03-28 NOTE — TELEPHONE ENCOUNTER
Returned call after discussing with Dr. Lieberman. Informing patient's wife that appointment will be pushed out a week and someone will be calling back with appointment dates and times for her.

## 2022-03-28 NOTE — TELEPHONE ENCOUNTER
Caller: NICOLE    Relationship to patient: WIFE    Best call back number: [phone]    Patient is needing: TO KNOW IF 3- APPTS LAB, F/U AND INFUSION APPTS NEED TO BE CANCELED. PT IS CURRENTLY IN REHAB AFTER HERNIA SURGERY.

## 2022-03-30 ENCOUNTER — APPOINTMENT (OUTPATIENT)
Dept: ONCOLOGY | Facility: HOSPITAL | Age: 82
End: 2022-03-30

## 2022-03-30 DIAGNOSIS — C64.1 METASTATIC RENAL CELL CARCINOMA, RIGHT: Primary | ICD-10-CM

## 2022-03-30 RX ORDER — SODIUM CHLORIDE 9 MG/ML
250 INJECTION, SOLUTION INTRAVENOUS ONCE
Status: CANCELLED | OUTPATIENT
Start: 2022-04-06

## 2022-03-31 ENCOUNTER — SPECIALTY PHARMACY (OUTPATIENT)
Dept: ONCOLOGY | Facility: HOSPITAL | Age: 82
End: 2022-03-31

## 2022-04-06 ENCOUNTER — HOSPITAL ENCOUNTER (OUTPATIENT)
Dept: ONCOLOGY | Facility: HOSPITAL | Age: 82
Setting detail: INFUSION SERIES
Discharge: HOME | End: 2022-04-06

## 2022-04-06 ENCOUNTER — OFFICE VISIT (OUTPATIENT)
Dept: ONCOLOGY | Facility: CLINIC | Age: 82
End: 2022-04-06

## 2022-04-06 VITALS
WEIGHT: 159.7 LBS | BODY MASS INDEX: 26.61 KG/M2 | SYSTOLIC BLOOD PRESSURE: 157 MMHG | RESPIRATION RATE: 16 BRPM | HEIGHT: 65 IN | OXYGEN SATURATION: 100 % | DIASTOLIC BLOOD PRESSURE: 64 MMHG | TEMPERATURE: 96 F | HEART RATE: 80 BPM

## 2022-04-06 DIAGNOSIS — C64.1 METASTATIC RENAL CELL CARCINOMA, RIGHT: Primary | ICD-10-CM

## 2022-04-06 LAB
ALBUMIN SERPL-MCNC: 3.7 G/DL (ref 3.5–5.2)
ALBUMIN/GLOB SERPL: 1.7 G/DL
ALP SERPL-CCNC: 99 U/L (ref 39–117)
ALT SERPL W P-5'-P-CCNC: 12 U/L (ref 1–41)
AMYLASE SERPL-CCNC: 130 U/L (ref 28–100)
ANION GAP SERPL CALCULATED.3IONS-SCNC: 8 MMOL/L (ref 5–15)
AST SERPL-CCNC: 22 U/L (ref 1–40)
BILIRUB SERPL-MCNC: 0.3 MG/DL (ref 0–1.2)
BILIRUB UR QL STRIP: NEGATIVE
BUN SERPL-MCNC: 19 MG/DL (ref 8–23)
BUN/CREAT SERPL: 13 (ref 7–25)
CALCIUM SPEC-SCNC: 9.4 MG/DL (ref 8.6–10.5)
CHLORIDE SERPL-SCNC: 107 MMOL/L (ref 98–107)
CLARITY UR: CLEAR
CO2 SERPL-SCNC: 24 MMOL/L (ref 22–29)
COLOR UR: YELLOW
CREAT SERPL-MCNC: 1.46 MG/DL (ref 0.76–1.27)
EGFRCR SERPLBLD CKD-EPI 2021: 48.3 ML/MIN/1.73
ERYTHROCYTE [DISTWIDTH] IN BLOOD BY AUTOMATED COUNT: 17.5 % (ref 12.3–15.4)
GLOBULIN UR ELPH-MCNC: 2.2 GM/DL
GLUCOSE SERPL-MCNC: 124 MG/DL (ref 65–99)
GLUCOSE UR STRIP-MCNC: NEGATIVE MG/DL
HCT VFR BLD AUTO: 47.1 % (ref 37.5–51)
HGB BLD-MCNC: 15 G/DL (ref 13–17.7)
HGB UR QL STRIP.AUTO: NEGATIVE
KETONES UR QL STRIP: NEGATIVE
LEUKOCYTE ESTERASE UR QL STRIP.AUTO: NEGATIVE
LIPASE SERPL-CCNC: 58 U/L (ref 13–60)
LYMPHOCYTES # BLD AUTO: 1.6 10*3/MM3 (ref 0.7–3.1)
LYMPHOCYTES NFR BLD AUTO: 22.4 % (ref 19.6–45.3)
MCH RBC QN AUTO: 30.2 PG (ref 26.6–33)
MCHC RBC AUTO-ENTMCNC: 31.8 G/DL (ref 31.5–35.7)
MCV RBC AUTO: 94.8 FL (ref 79–97)
MONOCYTES # BLD AUTO: 0.4 10*3/MM3 (ref 0.1–0.9)
MONOCYTES NFR BLD AUTO: 6.1 % (ref 5–12)
NEUTROPHILS NFR BLD AUTO: 5.1 10*3/MM3 (ref 1.7–7)
NEUTROPHILS NFR BLD AUTO: 71.5 % (ref 42.7–76)
NITRITE UR QL STRIP: NEGATIVE
PH UR STRIP.AUTO: 5 [PH] (ref 5–8)
PLATELET # BLD AUTO: 183 10*3/MM3 (ref 140–450)
PMV BLD AUTO: 6.6 FL (ref 6–12)
POTASSIUM SERPL-SCNC: 5.6 MMOL/L (ref 3.5–5.2)
PROT SERPL-MCNC: 5.9 G/DL (ref 6–8.5)
PROT UR QL STRIP: NEGATIVE
RBC # BLD AUTO: 4.97 10*6/MM3 (ref 4.14–5.8)
SODIUM SERPL-SCNC: 139 MMOL/L (ref 136–145)
SP GR UR STRIP: 1.02 (ref 1–1.03)
UROBILINOGEN UR QL STRIP: NORMAL
WBC NRBC COR # BLD: 7.1 10*3/MM3 (ref 3.4–10.8)

## 2022-04-06 PROCEDURE — 85025 COMPLETE CBC W/AUTO DIFF WBC: CPT | Performed by: INTERNAL MEDICINE

## 2022-04-06 PROCEDURE — 25010000002 PEMBROLIZUMAB 100 MG/4ML SOLUTION 4 ML VIAL: Performed by: INTERNAL MEDICINE

## 2022-04-06 PROCEDURE — 83690 ASSAY OF LIPASE: CPT | Performed by: INTERNAL MEDICINE

## 2022-04-06 PROCEDURE — 82150 ASSAY OF AMYLASE: CPT | Performed by: INTERNAL MEDICINE

## 2022-04-06 PROCEDURE — 99214 OFFICE O/P EST MOD 30 MIN: CPT | Performed by: INTERNAL MEDICINE

## 2022-04-06 PROCEDURE — 96413 CHEMO IV INFUSION 1 HR: CPT

## 2022-04-06 PROCEDURE — 81003 URINALYSIS AUTO W/O SCOPE: CPT | Performed by: INTERNAL MEDICINE

## 2022-04-06 PROCEDURE — 80053 COMPREHEN METABOLIC PANEL: CPT | Performed by: INTERNAL MEDICINE

## 2022-04-06 RX ORDER — SODIUM CHLORIDE 9 MG/ML
250 INJECTION, SOLUTION INTRAVENOUS ONCE
OUTPATIENT
Start: 2022-06-01

## 2022-04-06 RX ADMIN — SODIUM CHLORIDE 200 MG: 9 INJECTION, SOLUTION INTRAVENOUS at 14:37

## 2022-04-06 NOTE — PROGRESS NOTES
PROBLEM LIST:  Oncology/Hematology History   Metastatic renal cell carcinoma, right (HCC)   12/26/2021 Initial Diagnosis    Metastatic renal cell carcinoma (HCC)     12/27/2021 Cancer Staged    Staging form: Kidney, AJCC 8th Edition  - Pathologic stage from 1/3/2022: Stage IV (pT3, pN0, cM1) - Signed by Otf Cooper MD on 1/6/2022 12/30/2021 Surgery    Surgery       Procedure:  Right debulking nephrectomy by Dr. Pedro Rubio     1/26/2022 -  Chemotherapy    OP KIDNEY Axitinib / Pembrolizumab 200 mg         REASON FOR VISIT: Management of my kidney cancer    HISTORY OF PRESENT ILLNESS:   81 y.o.  male presents today for follow-up after undergoing a right nephrectomy by Dr. Pedro Rubio.  He is currently on Inlyta and Keytruda.  He has been off his treatment due to incarcerated inguinal hernia.  Subsequently was in rehab and returned home a week ago.  He still fairly hesitant about undergoing treatment.  Seems to be tolerating it reasonably well.  He does have some issues with memory though I suspect this is been present beforehand.  He is not having a lot of side effects to the treatment.    Past medical history, social history and family history was reviewed 04/06/22 and unchanged from prior visit.    Review of Systems:    Review of Systems   Constitutional: Negative.    HENT:  Negative.    Eyes: Negative.    Respiratory: Negative.    Cardiovascular: Negative.    Gastrointestinal: Negative.    Endocrine: Negative.    Genitourinary: Negative.     Musculoskeletal: Negative.    Skin: Negative.    Hematological: Negative.    Psychiatric/Behavioral: Negative.             Medications:        Current Outpatient Medications:   •  acetaminophen (TYLENOL) 325 MG tablet, Take 2 tablets by mouth Every 4 (Four) Hours As Needed for Mild Pain  or Moderate Pain ., Disp: , Rfl:   •  cefdinir (OMNICEF) 300 MG capsule, Take 1 capsule by mouth 2 (Two) Times a Day., Disp: 14 capsule, Rfl: 0  •  docusate sodium 100 MG  "capsule, Take 2 capsules by mouth Daily As Needed for Constipation., Disp: 30 each, Rfl: 3  •  lactobacillus acidophilus (RISAQUAD) capsule capsule, Take 1 capsule by mouth Daily., Disp: 30 capsule, Rfl: 0  •  nystatin (MYCOSTATIN) 509863 UNIT/GM powder, Apply  topically to the appropriate area as directed Every 12 (Twelve) Hours., Disp: 30 g, Rfl: 0  •  ondansetron (ZOFRAN) 8 MG tablet, Take 1 tablet by mouth 3 (Three) Times a Day As Needed for Nausea or Vomiting., Disp: 30 tablet, Rfl: 5    Pain Medications             acetaminophen (TYLENOL) 325 MG tablet Take 2 tablets by mouth Every 4 (Four) Hours As Needed for Mild Pain  or Moderate Pain .             ALLERGIES:  No Known Allergies      Physical Exam    VITAL SIGNS:  /64   Pulse 80   Temp 96 °F (35.6 °C) (Temporal)   Resp 16   Ht 165.1 cm (65\")   Wt 72.4 kg (159 lb 11.2 oz)   SpO2 100%   BMI 26.58 kg/m²     ECOG score: 1           Wt Readings from Last 3 Encounters:   04/06/22 72.4 kg (159 lb 11.2 oz)   03/18/22 66.2 kg (146 lb)   03/09/22 72.2 kg (159 lb 4 oz)       Body mass index is 26.58 kg/m². Body surface area is 1.8 meters squared.    Pain Score    04/06/22 1253   PainSc: 0-No pain           Performance Status: 0    General: well appearing, in no acute distress  HEENT: sclera anicteric, neck is supple  Lymphatics: no cervical, supraclavicular, or axillary adenopathy  Cardiovascular: regular rate and rhythm, no murmurs, rubs or gallops  Lungs: clear to auscultation bilaterally  Abdomen: soft, nontender, nondistended.  No palpable organomegaly  Extremities: no lower extremity edema  Skin: no rashes, lesions, bruising, or petechiae  Msk:  Shows no weakness of the large muscle groups  Psych: Mood is stable        RECENT LABS:    Lab Results   Component Value Date    HGB 15.0 04/06/2022    HCT 47.1 04/06/2022    MCV 94.8 04/06/2022     04/06/2022    WBC 7.10 04/06/2022    NEUTROABS 5.10 04/06/2022    LYMPHSABS 1.60 04/06/2022    MONOSABS " 0.40 04/06/2022    EOSABS 0.09 03/19/2022    BASOSABS 0.03 03/19/2022       Lab Results   Component Value Date    GLUCOSE 111 (H) 03/24/2022    BUN 17 03/24/2022    CREATININE 1.26 03/24/2022     03/24/2022    K 4.0 03/24/2022     03/24/2022    CO2 22.0 03/24/2022    CALCIUM 8.8 03/24/2022    PROTEINTOT 5.3 (L) 03/24/2022    ALBUMIN 3.10 (L) 03/24/2022    BILITOT 0.3 03/24/2022    ALKPHOS 74 03/24/2022    AST 17 03/24/2022    ALT 15 03/24/2022     Lab Results   Component Value Date    FINALDX  03/17/2022     SOFT TISSUE, HERNIORRHAPHY:  Inflamed fibroadipose tissue compatible with hernia sac with abscess and gangrenous necrosis.  K         CT Abdomen Pelvis Without Contrast    Result Date: 12/26/2021  1. Interval enlargement of a malignant right upper pole renal mass with interval development of a new right lower pole renal mass. Findings are compatible with renal cell carcinoma. 2. There is metastatic disease in both lung bases. 3. High density material in the right renal pelvis probably reflects some clot although tumor is not excluded. The left kidney is normal. 4. Retroperitoneal adenopathy may reflect metastatic disease. 5. No acute findings in the GI tract. Normal appendix. 6. Additional findings as above. Signer Name: Naveed Ventura MD  Signed: 12/26/2021 12:47 AM  Workstation Name: [facility]  [facility]    CT Chest Without Contrast Diagnostic    Result Date: 12/27/2021  1. Scattered small round pulmonary parenchymal nodules, mostly of the lower lungs, suspicious for hematogenous spread of metastatic disease.  2. No evidence of active chest disease elsewhere.  DICTATED:   12/26/2021 EDITED/lfs:   12/26/2021   This report was finalized on 12/27/2021 12:59 PM by Dr. Dwight Galindo MD.      MRI Brain Without Contrast    Result Date: 12/27/2021  No evidence of intracranial metastatic disease or other acute intracranial disease.  DICTATED:   12/26/2021 EDITED/lfs:   12/26/2021    This report was finalized on 12/27/2021 12:13 AM by Dr. Dwight Galindo MD.      NM Renal With Flow & Function Without Pharmacological Intervention    Result Date: 1/1/2022  Essentially nonfunctioning right kidney, largely replaced by tumor. No visible excretion of contrast. Normal concentration and excretion of contrast by the left kidney.  D:  12/30/2021 E:  12/30/2021  This report was finalized on 1/1/2022 6:42 PM by Dr. Dwight Galindo MD.      US Renal Limited    Result Date: 12/28/2021  Right superior pole 8 cm renal mass with lobulated margins better seen on recent CT. Nonobstructing right nephrolithiasis.   D:  12/28/2021 E:  12/28/2021  This report was finalized on 12/28/2021 12:24 PM by Dr. Jama Vanegas.            Assessment/Plan    1.  Stage IV renal cell carcinoma with lung metastases.  After long discussion with him today he has decided to continue with Inlyta and Keytruda.  Plan to rescan him in 6 weeks to make sure he is doing well.  He is very hesitant about proceeding with therapy indefinitely.  Unfortunately he has metastatic disease with lung nodules.  He is looking at a long course of therapy with this regimen.  Hopefully he does well.  I think he is having a response.  I compared the scans which were done several weeks ago up to the lower area of his lungs.  The lung nodule seen much better.    Total time of patient care on day of service including time prior to, face to face with patient, and following visit spent in reviewing records, lab results, imaging studies, discussion with patient, and documentation/charting was > 32 minutes.     Otf Cooper MD  [facility]ematology [facility] On[facility]    Return on: 04/27/22  Return in (Approximately): 3 weeks, Schedule with next infusion    Orders Placed This Encounter   Procedures   • CT Chest Without Contrast Diagnostic   • CT Abdomen Pelvis Without Contrast   • Comprehensive metabolic panel   • TSH   • T4, free   • CBC and Differential       4/6/2022

## 2022-04-07 ENCOUNTER — SPECIALTY PHARMACY (OUTPATIENT)
Dept: ONCOLOGY | Facility: HOSPITAL | Age: 82
End: 2022-04-07

## 2022-04-07 DIAGNOSIS — C64.1 RENAL CELL CARCINOMA OF RIGHT KIDNEY METASTATIC TO OTHER SITE: ICD-10-CM

## 2022-04-24 NOTE — PLAN OF CARE
Goal Outcome Evaluation:         Pt A&Ox4. RR unlabored on RA. NSR with frequent PACs and PVCs. Ambulated in hallway assist x1. D/Cd lindsay, voided spontaneously without difficulty. Denies pain this shift. VSS. Wife at bedside part of shift.        
Goal Outcome Evaluation:  Plan of Care Reviewed With: patient           Outcome Summary: No changes this shift. Pt a/ox4 VSS. White still in place per urology. Surgical sites clean dry and intact. No further complaints at this time.  
Goal Outcome Evaluation:  Plan of Care Reviewed With: patient           Outcome Summary: No changes this shift. Pt rested well. VSS, a/ox4, RA. Continues to void without issue. Surgical sites CDI. Pt ambulated to bathroom once this shift. No further complaints at this time.  
Goal Outcome Evaluation:  Plan of Care Reviewed With: patient           Outcome Summary: No changes this shift. White removed yesterday. Pt voiding spontaneously and without issue. Surgical sites clean, dry, and intact. Pt ambulated twice to the bathroom. No further complaints at this time.  
Goal Outcome Evaluation:  Plan of Care Reviewed With: patient        Progress: improving  Outcome Summary: PT DEMONSTRATES IMPROVE BED MOBILITY. INCREASED DISTANCE AMBULATED  FEET WITH R WALKER AND SBA. RECOMMEND HOME WITH FAMILY, R WALKER AND HHPT.  
Goal Outcome Evaluation:  Plan of Care Reviewed With: patient        Progress: improving  Pt A&Ox4, but forgetful. VSS. RR unlabored on RA. Incision CDI. Pt declines pain or discomfort. Ambulated in hallway 120ft x1 assist. Voiding spontaneously without difficulty.   
declines

## 2022-04-27 ENCOUNTER — HOSPITAL ENCOUNTER (OUTPATIENT)
Dept: ONCOLOGY | Facility: HOSPITAL | Age: 82
Setting detail: INFUSION SERIES
Discharge: HOME | End: 2022-04-27

## 2022-04-27 ENCOUNTER — SPECIALTY PHARMACY (OUTPATIENT)
Dept: ONCOLOGY | Facility: HOSPITAL | Age: 82
End: 2022-04-27

## 2022-04-27 ENCOUNTER — OFFICE VISIT (OUTPATIENT)
Dept: ONCOLOGY | Facility: CLINIC | Age: 82
End: 2022-04-27

## 2022-04-27 VITALS
BODY MASS INDEX: 25.83 KG/M2 | TEMPERATURE: 96.1 F | DIASTOLIC BLOOD PRESSURE: 68 MMHG | OXYGEN SATURATION: 98 % | RESPIRATION RATE: 16 BRPM | SYSTOLIC BLOOD PRESSURE: 142 MMHG | HEIGHT: 65 IN | HEART RATE: 73 BPM | WEIGHT: 155 LBS

## 2022-04-27 DIAGNOSIS — C64.1 METASTATIC RENAL CELL CARCINOMA, RIGHT: ICD-10-CM

## 2022-04-27 DIAGNOSIS — C64.1 RENAL CELL CARCINOMA OF RIGHT KIDNEY METASTATIC TO OTHER SITE: Primary | ICD-10-CM

## 2022-04-27 LAB
ALBUMIN SERPL-MCNC: 4.5 G/DL (ref 3.5–5.2)
ALBUMIN/GLOB SERPL: 2 G/DL
ALP SERPL-CCNC: 98 U/L (ref 39–117)
ALT SERPL W P-5'-P-CCNC: 38 U/L (ref 1–41)
ANION GAP SERPL CALCULATED.3IONS-SCNC: 12 MMOL/L (ref 5–15)
AST SERPL-CCNC: 39 U/L (ref 1–40)
BILIRUB SERPL-MCNC: 0.7 MG/DL (ref 0–1.2)
BUN SERPL-MCNC: 49 MG/DL (ref 8–23)
BUN/CREAT SERPL: 28.3 (ref 7–25)
CALCIUM SPEC-SCNC: 10.2 MG/DL (ref 8.6–10.5)
CHLORIDE SERPL-SCNC: 103 MMOL/L (ref 98–107)
CO2 SERPL-SCNC: 23 MMOL/L (ref 22–29)
CREAT SERPL-MCNC: 1.73 MG/DL (ref 0.76–1.27)
EGFRCR SERPLBLD CKD-EPI 2021: 39.2 ML/MIN/1.73
ERYTHROCYTE [DISTWIDTH] IN BLOOD BY AUTOMATED COUNT: 16.1 % (ref 12.3–15.4)
GLOBULIN UR ELPH-MCNC: 2.2 GM/DL
GLUCOSE SERPL-MCNC: 93 MG/DL (ref 65–99)
HCT VFR BLD AUTO: 56.1 % (ref 37.5–51)
HGB BLD-MCNC: 17.9 G/DL (ref 13–17.7)
LYMPHOCYTES # BLD AUTO: 2.3 10*3/MM3 (ref 0.7–3.1)
LYMPHOCYTES NFR BLD AUTO: 26.2 % (ref 19.6–45.3)
MCH RBC QN AUTO: 29.9 PG (ref 26.6–33)
MCHC RBC AUTO-ENTMCNC: 31.9 G/DL (ref 31.5–35.7)
MCV RBC AUTO: 93.9 FL (ref 79–97)
MONOCYTES # BLD AUTO: 0.3 10*3/MM3 (ref 0.1–0.9)
MONOCYTES NFR BLD AUTO: 3.9 % (ref 5–12)
NEUTROPHILS NFR BLD AUTO: 6.2 10*3/MM3 (ref 1.7–7)
NEUTROPHILS NFR BLD AUTO: 69.9 % (ref 42.7–76)
PLATELET # BLD AUTO: 123 10*3/MM3 (ref 140–450)
PMV BLD AUTO: 7.1 FL (ref 6–12)
POTASSIUM SERPL-SCNC: 6.2 MMOL/L (ref 3.5–5.2)
PROT SERPL-MCNC: 6.7 G/DL (ref 6–8.5)
RBC # BLD AUTO: 5.97 10*6/MM3 (ref 4.14–5.8)
SODIUM SERPL-SCNC: 138 MMOL/L (ref 136–145)
T4 FREE SERPL-MCNC: 0.76 NG/DL (ref 0.93–1.7)
TSH SERPL DL<=0.05 MIU/L-ACNC: 9.06 UIU/ML (ref 0.27–4.2)
WBC NRBC COR # BLD: 8.9 10*3/MM3 (ref 3.4–10.8)

## 2022-04-27 PROCEDURE — 36415 COLL VENOUS BLD VENIPUNCTURE: CPT

## 2022-04-27 PROCEDURE — 85025 COMPLETE CBC W/AUTO DIFF WBC: CPT | Performed by: INTERNAL MEDICINE

## 2022-04-27 PROCEDURE — 84439 ASSAY OF FREE THYROXINE: CPT | Performed by: INTERNAL MEDICINE

## 2022-04-27 PROCEDURE — 80053 COMPREHEN METABOLIC PANEL: CPT | Performed by: INTERNAL MEDICINE

## 2022-04-27 PROCEDURE — 99214 OFFICE O/P EST MOD 30 MIN: CPT | Performed by: INTERNAL MEDICINE

## 2022-04-27 PROCEDURE — 84443 ASSAY THYROID STIM HORMONE: CPT | Performed by: INTERNAL MEDICINE

## 2022-04-27 RX ORDER — FUROSEMIDE 20 MG/1
20 TABLET ORAL DAILY
Qty: 3 TABLET | Refills: 0 | Status: SHIPPED | OUTPATIENT
Start: 2022-04-27

## 2022-04-27 NOTE — TELEPHONE ENCOUNTER
----- Message from Lyn Hartley RN sent at 4/27/2022  2:00 PM EDT -----  Regarding: Critical potassium      Critical Test Results      MD: Mio    Date: 04/27/2022     Critical test result: K+ 6.2    Time results received: 1400

## 2022-04-27 NOTE — TELEPHONE ENCOUNTER
Discussed with Dr. Lieberman. Dr. Lieberman wanting patient to increase fluids and to also take 3 days of fluids to get his potassium down. Patient's wife restating treatment plan with Lasix.

## 2022-04-27 NOTE — PROGRESS NOTES
PROBLEM LIST:  Oncology/Hematology History   Metastatic renal cell carcinoma, right (HCC)   12/26/2021 Initial Diagnosis    Metastatic renal cell carcinoma (HCC)     12/27/2021 Cancer Staged    Staging form: Kidney, AJCC 8th Edition  - Pathologic stage from 1/3/2022: Stage IV (pT3, pN0, cM1) - Signed by Otf Cooper MD on 1/6/2022 12/30/2021 Surgery    Surgery       Procedure:  Right debulking nephrectomy by Dr. Pedro Rubio     1/26/2022 -  Chemotherapy    OP KIDNEY Axitinib / Pembrolizumab 200 mg         REASON FOR VISIT: Management of my kidney cancer    HISTORY OF PRESENT ILLNESS:   82 y.o.  male presents today for follow-up after undergoing a right nephrectomy by Dr. Pedro Rubio.  I restarted him on Inlyta and Keytruda.  Unfortunately over the last couple of weeks he has had a profound change in his functional status.  He has become progressively weak.  He has become confused.  He is having a lot of issues with appetite.    Past medical history, social history and family history was reviewed 04/27/22 and unchanged from prior visit.    Review of Systems:    Review of Systems   Constitutional: Positive for appetite change, fatigue and unexpected weight change.   HENT:  Negative.    Eyes: Negative.    Respiratory: Negative.    Cardiovascular: Negative.    Gastrointestinal: Negative.    Endocrine: Negative.    Genitourinary: Negative.     Skin: Negative.    Neurological: Positive for extremity weakness.   Hematological: Negative.    Psychiatric/Behavioral: Positive for decreased concentration.            Medications:        Current Outpatient Medications:   •  acetaminophen (TYLENOL) 325 MG tablet, Take 2 tablets by mouth Every 4 (Four) Hours As Needed for Mild Pain  or Moderate Pain ., Disp: , Rfl:   •  axitinib (INLYTA) 5 MG chemo tablet, Take 1 tablet by mouth Every 12 (Twelve) Hours., Disp: 60 tablet, Rfl: 11  •  cefdinir (OMNICEF) 300 MG capsule, Take 1 capsule by mouth 2 (Two) Times a  "Day., Disp: 14 capsule, Rfl: 0  •  docusate sodium 100 MG capsule, Take 2 capsules by mouth Daily As Needed for Constipation., Disp: 30 each, Rfl: 3  •  lactobacillus acidophilus (RISAQUAD) capsule capsule, Take 1 capsule by mouth Daily., Disp: 30 capsule, Rfl: 0  •  nystatin (MYCOSTATIN) 629508 UNIT/GM powder, Apply  topically to the appropriate area as directed Every 12 (Twelve) Hours., Disp: 30 g, Rfl: 0  •  ondansetron (ZOFRAN) 8 MG tablet, Take 1 tablet by mouth 3 (Three) Times a Day As Needed for Nausea or Vomiting., Disp: 30 tablet, Rfl: 5    Pain Medications             acetaminophen (TYLENOL) 325 MG tablet Take 2 tablets by mouth Every 4 (Four) Hours As Needed for Mild Pain  or Moderate Pain .             ALLERGIES:  No Known Allergies      Physical Exam    VITAL SIGNS:  /68   Pulse 73   Temp 96.1 °F (35.6 °C) (Temporal)   Resp 16   Ht 165.1 cm (65\")   Wt 70.3 kg (155 lb)   SpO2 98%   BMI 25.79 kg/m²     ECOG score: 2           Wt Readings from Last 3 Encounters:   04/27/22 70.3 kg (155 lb)   04/06/22 72.4 kg (159 lb 11.2 oz)   03/18/22 66.2 kg (146 lb)       Body mass index is 25.79 kg/m². Body surface area is 1.77 meters squared.    Pain Score    04/27/22 1303   PainSc: 0-No pain           Performance Status: 3    General: Chronically ill appearing, in no acute distress  HEENT: sclera anicteric, neck is supple  Lymphatics: no cervical, supraclavicular, or axillary adenopathy  Cardiovascular: regular rate and rhythm, no murmurs, rubs or gallops  Lungs: clear to auscultation bilaterally  Abdomen: soft, nontender, nondistended.  No palpable organomegaly  Extremities: no lower extremity edema  Skin: no rashes, lesions, bruising, or petechiae  Msk: Weak  Psych: Appears slightly confused        RECENT LABS:    Lab Results   Component Value Date    HGB 17.9 (H) 04/27/2022    HCT 56.1 (H) 04/27/2022    MCV 93.9 04/27/2022     (L) 04/27/2022    WBC 8.90 04/27/2022    NEUTROABS 6.20 04/27/2022 "    LYMPHSABS 2.30 04/27/2022    MONOSABS 0.30 04/27/2022    EOSABS 0.09 03/19/2022    BASOSABS 0.03 03/19/2022       Lab Results   Component Value Date    GLUCOSE 124 (H) 04/06/2022    BUN 19 04/06/2022    CREATININE 1.46 (H) 04/06/2022     04/06/2022    K 5.6 (H) 04/06/2022     04/06/2022    CO2 24.0 04/06/2022    CALCIUM 9.4 04/06/2022    PROTEINTOT 5.9 (L) 04/06/2022    ALBUMIN 3.70 04/06/2022    BILITOT 0.3 04/06/2022    ALKPHOS 99 04/06/2022    AST 22 04/06/2022    ALT 12 04/06/2022     Lab Results   Component Value Date    FINALDX  03/17/2022     SOFT TISSUE, HERNIORRHAPHY:  Inflamed fibroadipose tissue compatible with hernia sac with abscess and gangrenous necrosis.  K         CT Abdomen Pelvis Without Contrast    Result Date: 12/26/2021  1. Interval enlargement of a malignant right upper pole renal mass with interval development of a new right lower pole renal mass. Findings are compatible with renal cell carcinoma. 2. There is metastatic disease in both lung bases. 3. High density material in the right renal pelvis probably reflects some clot although tumor is not excluded. The left kidney is normal. 4. Retroperitoneal adenopathy may reflect metastatic disease. 5. No acute findings in the GI tract. Normal appendix. 6. Additional findings as above. Signer Name: Naveed Ventura MD  Signed: 12/26/2021 12:47 AM  Workstation Name: [facility]  [facility]    CT Chest Without Contrast Diagnostic    Result Date: 12/27/2021  1. Scattered small round pulmonary parenchymal nodules, mostly of the lower lungs, suspicious for hematogenous spread of metastatic disease.  2. No evidence of active chest disease elsewhere.  DICTATED:   12/26/2021 EDITED/lfs:   12/26/2021   This report was finalized on 12/27/2021 12:59 PM by Dr. Dwight Galindo MD.      MRI Brain Without Contrast    Result Date: 12/27/2021  No evidence of intracranial metastatic disease or other acute intracranial disease.   DICTATED:   12/26/2021 EDITED/lfs:   12/26/2021   This report was finalized on 12/27/2021 12:13 AM by Dr. Dwight Galindo MD.      NM Renal With Flow & Function Without Pharmacological Intervention    Result Date: 1/1/2022  Essentially nonfunctioning right kidney, largely replaced by tumor. No visible excretion of contrast. Normal concentration and excretion of contrast by the left kidney.  D:  12/30/2021 E:  12/30/2021  This report was finalized on 1/1/2022 6:42 PM by Dr. Dwight Galindo MD.      US Renal Limited    Result Date: 12/28/2021  Right superior pole 8 cm renal mass with lobulated margins better seen on recent CT. Nonobstructing right nephrolithiasis.   D:  12/28/2021 E:  12/28/2021  This report was finalized on 12/28/2021 12:24 PM by Dr. Jama Vanegas.            Assessment/Plan    1.  Stage IV renal cell carcinoma with lung metastases.  I'm going to can hold his Inlyta and Keytruda for now.  I am getting give him a month to see if it will get better.  His disease is not in a month to see where we are with his overall disease state.  I may consider treating him with immunotherapy alone at that point.  Srapidly progressing.  So I think we have some time.  I would like to get CAT scans done currently his performance status declined significantly and we will have to wait till he improves before we can consider any further treatment.    Total time of patient care on day of service including time prior to, face to face with patient, and following visit spent in reviewing records, lab results, imaging studies, discussion with patient, and documentation/charting was > 31 minutes.     Otf Cooper MD  [facility]    Return on: 06/01/22    No orders of the defined types were placed in this encounter.      4/27/2022

## 2022-04-28 ENCOUNTER — TELEPHONE (OUTPATIENT)
Dept: ONCOLOGY | Facility: CLINIC | Age: 82
End: 2022-04-28

## 2022-04-28 NOTE — TELEPHONE ENCOUNTER
Returned call to patient's wife. Asking how patient is doing and if patient is urinating ok today and if he's having any more diarrhea episodes. Informing her that we may need to recheck labs again to make sure potassium levels are coming down and to please call when she gets message.

## 2022-04-28 NOTE — TELEPHONE ENCOUNTER
Caller: NICOLE SHARIF    Relationship: Emergency Contact    Best call back number: [phone]    Who are you requesting to speak with (clinical staff, provider,  specific staff member): CLINICAL    What was the call regarding: NICOLE IS CALLING TO REPORT THAT FREEDOM DRANK AROUND 2 LITERS OF FLUID LAST NIGHT. AND TOOK A LASIX.    SHE STATES THAT SOON AFTER HE HAD BAD DIARRHEA.    NICOLE IS RELUCTANT TO GIVE HIM THE OTHER LASIX PILL BECAUSE WHAT HAPPENED LAST NIGHT       Do you require a callback: YES

## 2022-04-29 NOTE — TELEPHONE ENCOUNTER
Returned call to patient's wife to follow up on patient. At this time she states patient has been admitted into [facility] and seems to be doing better. Patient's wife stating they will get second opinion while there.

## 2022-05-18 ENCOUNTER — APPOINTMENT (OUTPATIENT)
Dept: ONCOLOGY | Facility: HOSPITAL | Age: 82
End: 2022-05-18

## 2022-05-18 ENCOUNTER — APPOINTMENT (OUTPATIENT)
Dept: CT IMAGING | Facility: HOSPITAL | Age: 82
End: 2022-05-18

## 2022-05-19 ENCOUNTER — TELEPHONE (OUTPATIENT)
Dept: ONCOLOGY | Facility: CLINIC | Age: 82
End: 2022-05-19

## 2022-05-19 NOTE — TELEPHONE ENCOUNTER
Provider: DR BURNS  Caller: LEA  Relationship to Patient: [employer] [place]    Reason for Call: LEA IS CALLING STATES SHE IS NEEDING ICD 9 CODES AND CPT CODES FOR THE 12-28 BIOPSY ALSO WHEN FREEDOM HAD ALL HIS CHEMO TREATMENTS, SHE JUST NEEDS DATES ON THOSE.    PLEASE ADVISE

## 2022-05-19 NOTE — TELEPHONE ENCOUNTER
Provider: DR BURNS  Caller: NICOLE  Relationship to Patient: WIFE    Reason for Call: NICOLE IS CALLING TO CANCEL APPT FOR FOLLOW UP AND INFUSION FOR 6-1.    SHE DOES NOT WISH TO R/S AT THIS TIME.

## 2022-05-20 NOTE — TELEPHONE ENCOUNTER
We have not completed any forms for PT. We do not do CPT codes as that would come from the surgeon. There is no call back number or fax number listed.  [phone]  ~Shell

## 2022-05-23 LAB
CYTO UR: NORMAL
LAB AP CASE REPORT: NORMAL
LAB AP CLINICAL INFORMATION: NORMAL
LAB AP OUTSIDE REPORT, ADDENDUM: NORMAL
PATH REPORT.FINAL DX SPEC: NORMAL
PATH REPORT.GROSS SPEC: NORMAL

## 2022-06-01 ENCOUNTER — APPOINTMENT (OUTPATIENT)
Dept: CT IMAGING | Facility: HOSPITAL | Age: 82
End: 2022-06-01

## 2022-07-27 ENCOUNTER — SPECIALTY PHARMACY (OUTPATIENT)
Dept: ONCOLOGY | Facility: HOSPITAL | Age: 82
End: 2022-07-27

## 2024-12-23 NOTE — OUTREACH NOTE
General Surgery Week 1 Survey      Responses   [facility] patient discharged from? [facility]   Does the patient have one of the following disease processes/diagnoses(primary or secondary)? General Surgery   Week 1 attempt successful? No   Unsuccessful attempts Attempt 2          Darshana Barcenas RN  
Caregiver

## (undated) DEVICE — 3M™ IOBAN™ 2 ANTIMICROBIAL INCISE DRAPE 6650EZ: Brand: IOBAN™ 2

## (undated) DEVICE — TBG PENCL TELESCP MEGADYNE SMOKE EVAC 10FT

## (undated) DEVICE — KITTNER SPONGE: Brand: DEROYAL

## (undated) DEVICE — APPL CHLORAPREP W/TINT 26ML BLU

## (undated) DEVICE — ST TBG CONN PNEUMOCLEAR EVAC SMOKE HEAT/HUMID

## (undated) DEVICE — 3M™ DURAPORE™ SURGICAL TAPE 1538-3, 3 INCH X 10 YARD (7,5CM X 9,1M), 4 ROLLS/BOX: Brand: 3M™ DURAPORE™

## (undated) DEVICE — CANNULA SEAL

## (undated) DEVICE — COLUMN DRAPE

## (undated) DEVICE — TISSUE RETRIEVAL SYSTEM: Brand: INZII RETRIEVAL SYSTEM

## (undated) DEVICE — SUT NUROLON 0 MO7 CR8 18IN C541D

## (undated) DEVICE — PK MINOR SPLT 10

## (undated) DEVICE — 3M™ STERI-STRIP™ REINFORCED ADHESIVE SKIN CLOSURES, R1547, 1/2 IN X 4 IN (12 MM X 100 MM), 6 STRIPS/ENVELOPE: Brand: 3M™ STERI-STRIP™

## (undated) DEVICE — ECHELON FLEX POWERED PLUS ARTICULATING ENDOSCOPIC LINEAR CUTTER , 60MM: Brand: ECHELON FLEX

## (undated) DEVICE — PENROSE DRAIN 18 X .5" SILICONE: Brand: MEDLINE

## (undated) DEVICE — GLV SURG BIOGEL LTX PF 7

## (undated) DEVICE — SUT PDS 1 CTX 36IN VIO PDP371T

## (undated) DEVICE — SUT VIC 0 UR6 27IN VCP603H

## (undated) DEVICE — GLV SURG SENSICARE PI MIC PF SZ7.5 LF STRL

## (undated) DEVICE — TOTAL TRAY, 16FR 10ML SIL FOLEY, URN: Brand: MEDLINE

## (undated) DEVICE — DRAIN JACKSON PRATT ROUND 15FR: Brand: CARDINAL HEALTH

## (undated) DEVICE — GOWN,NON-REINFORCED,SIRUS,SET IN SLV,XL: Brand: MEDLINE

## (undated) DEVICE — VESSEL SEALER EXTEND: Brand: ENDOWRIST

## (undated) DEVICE — PATIENT RETURN ELECTRODE, SINGLE-USE, CONTACT QUALITY MONITORING, ADULT, WITH 9FT CORD, FOR PATIENTS WEIGING OVER 33LBS. (15KG): Brand: MEGADYNE

## (undated) DEVICE — UNDERGLV SURG BIOGEL INDICATOR LF PF 7.5

## (undated) DEVICE — ANTIBACTERIAL UNDYED BRAIDED (POLYGLACTIN 910), SYNTHETIC ABSORBABLE SUTURE: Brand: COATED VICRYL

## (undated) DEVICE — PK UROL DAVINCI 10

## (undated) DEVICE — DRSNG SURESITE WNDW 4X4.5

## (undated) DEVICE — SUT ETHLN 2/0 PS 18IN 585H

## (undated) DEVICE — BLADELESS OBTURATOR: Brand: WECK VISTA

## (undated) DEVICE — HLDR CATH FOLEY STATLOCK TRICOT PED 3WY

## (undated) DEVICE — BLANKT WARM UPPR/BDY ARM/OUT 57X196CM

## (undated) DEVICE — TRAP FLD MINIVAC MEGADYNE 100ML

## (undated) DEVICE — JACKSON-PRATT 100CC BULB RESERVOIR: Brand: CARDINAL HEALTH

## (undated) DEVICE — CULT AERO SGL

## (undated) DEVICE — SUT SILK 2/0 SH 30IN K833H

## (undated) DEVICE — TIP COVER ACCESSORY

## (undated) DEVICE — ARM DRAPE

## (undated) DEVICE — CLTH CLENS READYCLEANSE PERI CARE PK/5

## (undated) DEVICE — CULT ANAERB

## (undated) DEVICE — SUT MNCRYL PLS ANTIB UD 4/0 PS2 18IN

## (undated) DEVICE — ENDOPATH XCEL BLADELESS TROCARS WITH STABILITY SLEEVES: Brand: ENDOPATH XCEL